# Patient Record
Sex: MALE | Race: WHITE | NOT HISPANIC OR LATINO | Employment: OTHER | URBAN - METROPOLITAN AREA
[De-identification: names, ages, dates, MRNs, and addresses within clinical notes are randomized per-mention and may not be internally consistent; named-entity substitution may affect disease eponyms.]

---

## 2017-01-20 ENCOUNTER — OFFICE VISIT (OUTPATIENT)
Dept: CARDIOLOGY | Facility: MEDICAL CENTER | Age: 62
End: 2017-01-20
Payer: COMMERCIAL

## 2017-01-20 VITALS
HEIGHT: 77 IN | OXYGEN SATURATION: 96 % | DIASTOLIC BLOOD PRESSURE: 78 MMHG | WEIGHT: 242 LBS | SYSTOLIC BLOOD PRESSURE: 116 MMHG | HEART RATE: 81 BPM | BODY MASS INDEX: 28.57 KG/M2

## 2017-01-20 DIAGNOSIS — I48.3 TYPICAL ATRIAL FLUTTER (HCC): ICD-10-CM

## 2017-01-20 LAB — EKG IMPRESSION: NORMAL

## 2017-01-20 PROCEDURE — 93000 ELECTROCARDIOGRAM COMPLETE: CPT | Performed by: INTERNAL MEDICINE

## 2017-01-20 PROCEDURE — 99214 OFFICE O/P EST MOD 30 MIN: CPT | Performed by: INTERNAL MEDICINE

## 2017-01-20 NOTE — MR AVS SNAPSHOT
"        Benjamin John   2017 8:20 AM   Office Visit   MRN: [id number]    Department:  [facility]   Dept Phone:  [phone]    Description:  Male : 1955   Provider:  Ray Woods M.D.           Reason for Visit     Follow-Up           Allergies as of 2017     Allergen Noted Reactions    Codeine 2016   Vomiting, Nausea      You were diagnosed with     Atypical atrial flutter (CMS-[facility])   [168252]         Vital Signs     Blood Pressure Pulse Height Weight Body Mass Index Oxygen Saturation    116/78 mmHg 81 1.956 m (6' 5.01\") 109.77 kg (242 lb) 28.69 kg/m2 96%    Smoking Status                   Former Smoker           Basic Information     Date Of Birth Sex Race Ethnicity Preferred Language    1955 Male White Non- English      Your appointments     2017  9:00 AM   New Patient with Abner Porras M.D.   [facility] (--)    [address] [id number] [phone]           Please bring enclosed paperwork completed along with your insurance card and photo ID.              Problem List              ICD-10-CM Priority Class Noted - Resolved    Atrial flutter with rapid ventricular response (CMS-[facility]) I48.92   2016 - Present    Atrial flutter (CMS-[facility]) I48.92   2016 - Present      Health Maintenance        Date Due Completion Dates    IMM DTaP/Tdap/Td Vaccine (1 - Tdap) 1973 ---    COLONOSCOPY 2004 ---    IMM ZOSTER VACCINE 2014 ---    IMM INFLUENZA (1) 2016 ---            Results       Current Immunizations     No immunizations on file.      Below and/or attached are the medications your provider expects you to take. Review all of your home medications and newly ordered medications with your provider and/or pharmacist. Follow medication instructions as directed by your provider and/or pharmacist. Please keep your medication list with you and share with your provider. Update the information " when medications are discontinued, doses are changed, or new medications (including over-the-counter products) are added; and carry medication information at all times in the event of emergency situations     Allergies:  CODEINE - Vomiting,Nausea               Medications  Valid as of: January 20, 2017 -  8:40 AM    Generic Name Brand Name Tablet Size Instructions for use    Apixaban (Tab) ELIQUIS 5mg Take 1 Tab by mouth 2 Times a Day.        Coenzyme Q10 (Cap) coenzyme Q-10 30 MG Take 60 mg by mouth every day.        Creatine Monohydrate   Take  by mouth.        Digoxin (Tab) LANOXIN 250 MCG Take 1 Tab by mouth every day.        Flecainide Acetate (Tab) TAMBOCOR 50 MG Take 1 Tab by mouth 2 times a day.        Garlic   Take 1 Cap by mouth every day.        Homeopathic Products   Take 1 Tab by mouth 2 times a day as needed (cold/ flu symptoms).        Magnesium   Take 1 Tab by mouth 2 Times a Day.        Multiple Vitamins-Minerals (Tab) CENTRUM SILVER ULTRA MENS  Take 1 tablet by mouth every day.        Omega-3 Fatty Acids   Take 1 Cap by mouth every day.        Probiotic Product (Cap) Probiotic  Take 2 Caps by mouth every day.        .                 Medicines prescribed today were sent to:     [facility][id number] - [place], [place] - [address]    [address] [address]    Phone: [phone] Fax: [phone]    Open 24 Hours?: No    [id number] - [place], [place] - [address]    [address] [address]    Phone: [phone] Fax: [phone]    Open 24 Hours?: No      Medication refill instructions:       If your prescription bottle indicates you have medication refills left, it is not necessary to call your provider’s office. Please contact your pharmacy and they will refill your medication.    If your prescription bottle indicates you do not have any refills left, you may request refills at any time through one of the following ways: The online [employer]  system (except Urgent Care), by calling your provider’s office, or by asking your pharmacy to contact your provider’s office with a refill request. Medication refills are processed only during regular business hours and may not be available until the next business day. Your provider may request additional information or to have a follow-up visit with you prior to refilling your medication.   *Please Note: Medication refills are assigned a new Rx number when refilled electronically. Your pharmacy may indicate that no refills were authorized even though a new prescription for the same medication is available at the pharmacy. Please request the medicine by name with the pharmacy before contacting your provider for a refill.        Your To Do List     Future Labs/Procedures Complete By Expires    [facility] [employer] PATCH MONITOR  As directed 1/20/2018         [employer] Access Code: [email or web address]  Expires: 1/31/2017  8:17 AM    [employer]  A secure, online tool to manage your health information     [employer]’s [employer]® is a secure, online tool that connects you to your personalized health information from the privacy of your home -- day or night - making it very easy for you to manage your healthcare. Once the activation process is completed, you can even access your medical information using the [employer] yamil, which is available for free in the Apple Yamil store or Google Play store.     [employer] provides the following levels of access (as shown below):   My Chart Features   [facility] Doctor [facility]  Specialists [facility]  Urgent  Care Non-[facility]  Primary Care  Doctor   Email your healthcare team securely and privately 24/7 X X X    Manage appointments: schedule your next appointment; view details of past/upcoming appointments X      Request prescription refills. X      View recent personal medical records, including lab and immunizations X X X X   View health record, including health history, allergies, medications X X X X   Read  reports about your outpatient visits, procedures, consult and ER notes X X X X   See your discharge summary, which is a recap of your hospital and/or ER visit that includes your diagnosis, lab results, and care plan. X X       How to register for [employer]:  1. Go to  [email or web address].  2. Click on the Sign Up Now box, which takes you to the New Member Sign Up page. You will need to provide the following information:  a. Enter your [employer] Access Code exactly as it appears at the top of this page. (You will not need to use this code after you’ve completed the sign-up process. If you do not sign up before the expiration date, you must request a new code.)   b. Enter your date of birth.   c. Enter your home email address.   d. Click Submit, and follow the next screen’s instructions.  3. Create a [employer]Eyes ID. This will be your [employer] login ID and cannot be changed, so think of one that is secure and easy to remember.  4. Create a [employer] password. You can change your password at any time.  5. Enter your Password Reset Question and Answer. This can be used at a later time if you forget your password.   6. Enter your e-mail address. This allows you to receive e-mail notifications when new information is available in [employer].  7. Click Sign Up. You can now view your health information.    For assistance activating your [employer] account, call [phone]

## 2017-01-20 NOTE — PROGRESS NOTES
"Subjective:   Benjamin John is a 61 y.o. male who presents today with previous flutter with intercurrent illness. Echo and TFT's nl. Feels well. Sleep study pending. The patients [id number] score is 0    Past Medical History   Diagnosis Date   • Sepsis (CMS-HCC) 2009     secondary to perf'd bowel after colonscopy      History reviewed. No pertinent past surgical history.  History reviewed. No pertinent family history.  History   Smoking status   • Former Smoker   Smokeless tobacco   • Never Used     Allergies   Allergen Reactions   • Codeine Vomiting and Nausea     Outpatient Encounter Prescriptions as of 1/20/2017   Medication Sig Dispense Refill   • coenzyme Q-10 30 MG capsule Take 60 mg by mouth every day.     • flecainide (TAMBOCOR) 50 MG tablet Take 1 Tab by mouth 2 times a day. 180 Tab 3   • digoxin (LANOXIN) 250 MCG Tab Take 1 Tab by mouth every day. 90 Tab 3   • CREATINE MONOHYDRATE PO Take  by mouth.     • apixaban (ELIQUIS) 5mg Tab Take 1 Tab by mouth 2 Times a Day. 60 Tab 11   • Multiple Vitamins-Minerals (CENTRUM SILVER ULTRA MENS) Tab Take 1 tablet by mouth every day.     • GARLIC PO Take 1 Cap by mouth every day.     • Omega-3 Fatty Acids (FISH OIL PO) Take 1 Cap by mouth every day.     • MAGNESIUM PO Take 1 Tab by mouth 2 Times a Day.     • Probiotic Cap Take 2 Caps by mouth every day.     • Homeopathic Products (ZICAM COLD REMEDY PO) Take 1 Tab by mouth 2 times a day as needed (cold/ flu symptoms).       No facility-administered encounter medications on file as of 1/20/2017.     ROS     Objective:   /78 mmHg  Pulse 81  Ht 1.956 m (6' 5.01\")  Wt 109.77 kg (242 lb)  BMI 28.69 kg/m2  SpO2 96%    Physical Exam   Constitutional: He is oriented to person, place, and time. He appears well-developed and well-nourished.   HENT:   Mouth/Throat: Oropharynx is clear and moist.   Eyes: Conjunctivae and EOM are normal.   Neck: No JVD present. No thyroid mass present.   Cardiovascular: Normal " rate, regular rhythm, S1 normal, S2 normal and normal pulses.  PMI is not displaced.  Exam reveals no gallop.    No murmur heard.  Pulses:       Carotid pulses are 2+ on the right side, and 2+ on the left side.       Radial pulses are 2+ on the right side, and 2+ on the left side.        Femoral pulses are 2+ on the right side, and 2+ on the left side.       Dorsalis pedis pulses are 2+ on the right side, and 2+ on the left side.   No peripheral edema.   Pulmonary/Chest: Effort normal and breath sounds normal.   Abdominal: Soft. Normal appearance. He exhibits no abdominal bruit. There is no hepatosplenomegaly. There is no tenderness.   Musculoskeletal: Normal range of motion. He exhibits no edema.        Thoracic back: He exhibits no tenderness and no spasm.   Neurological: He is alert and oriented to person, place, and time.   Skin: No rash noted. No cyanosis. Nails show no clubbing.   Psychiatric: He has a normal mood and affect.       Assessment:     1. Typical atrial flutter (CMS-HCC)         Medical Decision Making:  Today's Assessment / Status / Plan:   1. Atrial flutter DC NOAC. Taper Tambocor.  Check zio.  2. Sleep study pending.  3. F/U in thee months.

## 2017-01-20 NOTE — Clinical Note
"     [facility]   [address], [address]  [place] [address]  Phone: [phone]  Fax: [phone]              Benjamin John  1955    Encounter Date: 1/20/2017    Ray Woods M.D.          PROGRESS NOTE:  Subjective:   Benjamin John is a 61 y.o. male who presents today with previous flutter with intercurrent illness. Echo and TFT's nl. Feels well. Sleep study pending. The patients [id number] score is 0    Past Medical History   Diagnosis Date   • Sepsis (CMS-HCC) 2009     secondary to perf'd bowel after colonscopy      History reviewed. No pertinent past surgical history.  History reviewed. No pertinent family history.  History   Smoking status   • Former Smoker   Smokeless tobacco   • Never Used     Allergies   Allergen Reactions   • Codeine Vomiting and Nausea     Outpatient Encounter Prescriptions as of 1/20/2017   Medication Sig Dispense Refill   • coenzyme Q-10 30 MG capsule Take 60 mg by mouth every day.     • flecainide (TAMBOCOR) 50 MG tablet Take 1 Tab by mouth 2 times a day. 180 Tab 3   • digoxin (LANOXIN) 250 MCG Tab Take 1 Tab by mouth every day. 90 Tab 3   • CREATINE MONOHYDRATE PO Take  by mouth.     • apixaban (ELIQUIS) 5mg Tab Take 1 Tab by mouth 2 Times a Day. 60 Tab 11   • Multiple Vitamins-Minerals (CENTRUM SILVER ULTRA MENS) Tab Take 1 tablet by mouth every day.     • GARLIC PO Take 1 Cap by mouth every day.     • Omega-3 Fatty Acids (FISH OIL PO) Take 1 Cap by mouth every day.     • MAGNESIUM PO Take 1 Tab by mouth 2 Times a Day.     • Probiotic Cap Take 2 Caps by mouth every day.     • Homeopathic Products (ZICAM COLD REMEDY PO) Take 1 Tab by mouth 2 times a day as needed (cold/ flu symptoms).       No facility-administered encounter medications on file as of 1/20/2017.     ROS     Objective:   /78 mmHg  Pulse 81  Ht 1.956 m (6' 5.01\")  Wt 109.77 kg (242 lb)  BMI 28.69 kg/m2  SpO2 96%    Physical Exam   "   Constitutional: He is oriented to person, place, and time. He appears well-developed and well-nourished.   HENT:   Mouth/Throat: Oropharynx is clear and moist.   Eyes: Conjunctivae and EOM are normal.   Neck: No JVD present. No thyroid mass present.   Cardiovascular: Normal rate, regular rhythm, S1 normal, S2 normal and normal pulses.  PMI is not displaced.  Exam reveals no gallop.    No murmur heard.  Pulses:       Carotid pulses are 2+ on the right side, and 2+ on the left side.       Radial pulses are 2+ on the right side, and 2+ on the left side.        Femoral pulses are 2+ on the right side, and 2+ on the left side.       Dorsalis pedis pulses are 2+ on the right side, and 2+ on the left side.   No peripheral edema.   Pulmonary/Chest: Effort normal and breath sounds normal.   Abdominal: Soft. Normal appearance. He exhibits no abdominal bruit. There is no hepatosplenomegaly. There is no tenderness.   Musculoskeletal: Normal range of motion. He exhibits no edema.        Thoracic back: He exhibits no tenderness and no spasm.   Neurological: He is alert and oriented to person, place, and time.   Skin: No rash noted. No cyanosis. Nails show no clubbing.   Psychiatric: He has a normal mood and affect.       Assessment:     1. Typical atrial flutter (CMS-HCC)         Medical Decision Making:  Today's Assessment / Status / Plan:   1. Atrial flutter DC NOAC. Taper Tambocor.  Check zio.  2. Sleep study pending.  3. F/U in thee months.        Germán Arroyo D.O.  [address]  [address]  [place] [address]  VIA Facsimile: [phone]

## 2017-02-15 ENCOUNTER — TELEPHONE (OUTPATIENT)
Dept: CARDIOLOGY | Facility: MEDICAL CENTER | Age: 62
End: 2017-02-15

## 2017-02-15 ENCOUNTER — NON-PROVIDER VISIT (OUTPATIENT)
Dept: CARDIOLOGY | Facility: MEDICAL CENTER | Age: 62
End: 2017-02-15
Attending: INTERNAL MEDICINE
Payer: COMMERCIAL

## 2017-02-15 DIAGNOSIS — I47.19 PAT (PAROXYSMAL ATRIAL TACHYCARDIA): ICD-10-CM

## 2017-02-15 DIAGNOSIS — I48.0 PAF (PAROXYSMAL ATRIAL FIBRILLATION) (HCC): ICD-10-CM

## 2017-02-16 ENCOUNTER — HOME STUDY (OUTPATIENT)
Dept: SLEEP MEDICINE | Facility: MEDICAL CENTER | Age: 62
End: 2017-02-16
Attending: INTERNAL MEDICINE
Payer: COMMERCIAL

## 2017-02-16 ENCOUNTER — SLEEP CENTER VISIT (OUTPATIENT)
Dept: SLEEP MEDICINE | Facility: MEDICAL CENTER | Age: 62
End: 2017-02-16
Payer: COMMERCIAL

## 2017-02-16 VITALS
SYSTOLIC BLOOD PRESSURE: 104 MMHG | HEART RATE: 83 BPM | HEIGHT: 77 IN | DIASTOLIC BLOOD PRESSURE: 68 MMHG | WEIGHT: 242 LBS | OXYGEN SATURATION: 96 % | BODY MASS INDEX: 28.57 KG/M2 | RESPIRATION RATE: 16 BRPM

## 2017-02-16 DIAGNOSIS — I48.0 PAROXYSMAL ATRIAL FIBRILLATION (HCC): ICD-10-CM

## 2017-02-16 PROCEDURE — 99203 OFFICE O/P NEW LOW 30 MIN: CPT | Performed by: INTERNAL MEDICINE

## 2017-02-16 PROCEDURE — 94762 N-INVAS EAR/PLS OXIMTRY CONT: CPT | Performed by: INTERNAL MEDICINE

## 2017-02-16 RX ORDER — ASPIRIN 81 MG/1
81 TABLET ORAL DAILY
COMMUNITY

## 2017-02-16 RX ORDER — UBIDECARENONE 30 MG
200 CAPSULE ORAL
COMMUNITY

## 2017-02-16 NOTE — PROGRESS NOTES
Benjamin John is a 61 y.o. male here for atrial fibrillation.  Patient was referred by Dr. Germán Arroyo.    History of Present Illness:    The patient is a 61-year-old with a recent history of atrial fibrillation. He says that he developed atrial fibrillation after or during a upper respiratory infection. He has no prior cardiac history. He has no history of lung disease. He is referred for possible sleep apnea. The patient has no symptoms of sleep apnea. He denies any daytime sleepiness and he does not snore. His wife has obstructive sleep apnea and she is on a CPAP machine. She specifically has observed him sleep and she has not seen him do any apnea and he doesn't snore. She has not witnessed that he gasps in his sleep. The patient denies any nocturnal gasping or choking or shortness of breath. He goes to bed between 10 and 11:00 at night and will follow sleep within 30 minutes. He wakes up at 6:30 in morning feeling rested. During the daytime he denies any sleepiness and is able to do all of his usual daily activities without difficulty and without excessive fatigue or tiredness. He denies a nocturnal dry throat he denies nocturnal sweats and denies morning headaches. He usually has to get up once during the night to use the bathroom. He denies nocturnal seizures and does not sleepwalk or physically active his dreams. He denies any restless legs or nocturnal leg kicking or jerking. He is a very remote smoker. He smoked one half pack a day for just about 2-3 years when he was in the . He denies any history of lung disease such as COPD or asthma. He denies any dyspnea on exertion or shortness of breath. Besides the atrial fibrillation the patient has had no other cardiac history such as congestive heart failure or coronary disease. The patient is now off of all medications for his atrial fibrillation and is now on a two-week monitor. He has remained in sinus rhythm.    Constitutional:  Negative  for fever, chills, sweats, and fatigue.  Eyes:  Negative for eye pain and visual changes.  HENT:  Negative for tinnitus and hoarse voice.  Cardiovascular:  Negative for chest pain, leg swelling, syncope and orthopnea.  Respiratory:  See HPI for pertinent negatives  Sleep:  Negative for somnolence, loud snoring, sleep disturbance due to breathing, insomnia.  Gastrointestinal:  Negative for dysphagia, nausea and abdominal pain.  Heme/lymph:  Denies easy bruising, blood clots.  Musculoskeletal:  Negative for arthralgias, sore muscles and back pain.  Skin:  Negative for rash and color change.  Neurological:  Negative for headaches, lightheadedness and weakness.  Psychiatric:  Denies depression.    Current Outpatient Prescriptions   Medication Sig Dispense Refill   • MELATONIN PO Take  by mouth.     • coenzyme Q-10 100 MG Cap capsule Take 200 mg by mouth every day.     • IRON PO Take  by mouth.     • aspirin (ASPIRIN ADULT LOW DOSE) 81 MG EC tablet Take  by mouth.     • CREATINE MONOHYDRATE PO Take  by mouth.     • Multiple Vitamins-Minerals (CENTRUM SILVER ULTRA MENS) Tab Take 1 tablet by mouth every day.     • GARLIC PO Take 1 Cap by mouth every day.     • Omega-3 Fatty Acids (FISH OIL PO) Take 1 Cap by mouth every day.     • MAGNESIUM PO Take 1 Tab by mouth 2 Times a Day.     • Probiotic Cap Take 2 Caps by mouth every day.     • coenzyme Q-10 30 MG capsule Take 60 mg by mouth every day.     • flecainide (TAMBOCOR) 50 MG tablet Take 1 Tab by mouth 2 times a day. 180 Tab 3   • digoxin (LANOXIN) 250 MCG Tab Take 1 Tab by mouth every day. 90 Tab 3   • apixaban (ELIQUIS) 5mg Tab Take 1 Tab by mouth 2 Times a Day. 60 Tab 11   • Homeopathic Products (ZICAM COLD REMEDY PO) Take 1 Tab by mouth 2 times a day as needed (cold/ flu symptoms).       No current facility-administered medications for this visit.       Social History   Substance Use Topics   • Smoking status: Former Smoker     Quit date: 01/01/1977   • Smokeless  "tobacco: Never Used   • Alcohol Use: No       Past Medical History   Diagnosis Date   • Sepsis (CMS-[facility]) 2009     secondary to perf'd bowel after colonscopy    • Nasal drainage    • Atrial fibrillation (CMS-HCC)    • Back pain    • Tonsillitis        Past Surgical History   Procedure Laterality Date   • Tonsillectomy     • Cholecystectomy         Allergies:  Codeine    Family History   Problem Relation Age of Onset   • Heart Attack Father    • Sleep Apnea Neg Hx        Physical Examination    Filed Vitals:    02/16/17 0858   Height: 1.956 m (6' 5\")   Weight: 109.77 kg (242 lb)   Weight % change since last entry.: 0 %   BP: 104/68   Pulse: 83   BMI (Calculated): 28.7   Resp: 16   Neck circumference: 16       Physical Exam:  Constitutional:  Well developed and well nourished.  Head:  Normocephalic and atraumatic.  Nose:  Nose normal.  Mouth/Throat:  Oropharynx is clear and moist, no lesions. Mallampati class I   Eyes:  Conjunctivae and EOM are normal.  Pupils are equal, round, and reactive to light.  Neck:  Normal range of motion.  Supple.  No JVD. No tracheal deviation.  No thyromegally  Cardiovascular:  Normal rate, regular rhythm, normal heart sounds and intact distal pulses.  Pulmonary/Chest:  No accessory muscle use.  No wheezing, rales or rhonchi.  No dullness to percussion, tenderness or deformity.  Abdominal:  Soft.  No ascites.  No Hepatosplenomegally.  Non tender.  Musculoskeletal.  Normal range of motion.  No muscular atrophy.  Lymphadenopathy:  No cervical or supraclavicular adenopathy  Neurological:  Alert and oriented.  Cranial nerves intact.  No focal deficits  Skin:  No rashes or ulcers.  Psyciatric:  Normal mood and affect.    Assessment and Plan:  1. Paroxysmal atrial fibrillation (CMS-HCC)  The patient has no clinical signs or symptoms of obstructive sleep apnea. It is possible that he may have sleep apnea and I have offered him an overnight sleep study but he is refusing at this time. He is willing " to do an overnight oximetry at home. If there is evidence of oxygen desaturations during his sleep then he will be willing to come in for sleep study at that time. Also if he develops recurring A. fib then will be willing to come in for sleep study but at this time he is convinced that the A. fib was just a unusual occurrence.  - OVERNIGHT OXIMETRY; Future          Followup Return for sooner if needed.

## 2017-02-16 NOTE — MR AVS SNAPSHOT
Benjamin John   2017 2:20 PM   Appointment   MRN: [id number]    Department:  [facility]   Dept Phone:  [phone]    Description:  Male : 1955   Provider:  SLEEP CLINIC           Allergies as of 2017     Allergen Noted Reactions    Codeine 2016   Vomiting, Nausea      You were diagnosed with     Paroxysmal atrial fibrillation (CMS-HCC)   [713176]         Vital Signs     Smoking Status                   Former Smoker           Basic Information     Date Of Birth Sex Race Ethnicity Preferred Language    1955 Male White Non- English      Your appointments     2017  2:20 PM   Overnight Oximetry with SLEEP CLINIC   [facility] (--)    [address]  [address]  [place] [id number] [phone]            2017  2:00 PM   FOLLOW UP with Ray Woods M.D.   [facility] (--)    [address], [address]  [place] [id number] [phone]              Problem List              ICD-10-CM Priority Class Noted - Resolved    Atrial flutter with rapid ventricular response (CMS-HCC) I48.92   2016 - Present    Atrial flutter (CMS-HCC) I48.92   2016 - Present      Health Maintenance        Date Due Completion Dates    IMM DTaP/Tdap/Td Vaccine (1 - Tdap) 1973 ---    COLONOSCOPY 2004 ---    IMM ZOSTER VACCINE 2014 ---    IMM INFLUENZA (1) 2016 ---            Current Immunizations     No immunizations on file.      Below and/or attached are the medications your provider expects you to take. Review all of your home medications and newly ordered medications with your provider and/or pharmacist. Follow medication instructions as directed by your provider and/or pharmacist. Please keep your medication list with you and share with your provider. Update the information when medications are discontinued, doses are changed, or new medications (including over-the-counter  products) are added; and carry medication information at all times in the event of emergency situations     Allergies:  CODEINE - Vomiting,Nausea               Medications  Valid as of: February 16, 2017 -  9:34 AM    Generic Name Brand Name Tablet Size Instructions for use    Apixaban (Tab) ELIQUIS 5mg Take 1 Tab by mouth 2 Times a Day.        Aspirin (Tablet Delayed Response) aspirin 81 MG Take  by mouth.        Coenzyme Q10 (Cap) coenzyme Q-10 30 MG Take 60 mg by mouth every day.        Coenzyme Q10 (Cap) coenzyme Q-10 100 MG Take 200 mg by mouth every day.        Creatine Monohydrate   Take  by mouth.        Digoxin (Tab) LANOXIN 250 MCG Take 1 Tab by mouth every day.        Flecainide Acetate (Tab) TAMBOCOR 50 MG Take 1 Tab by mouth 2 times a day.        Garlic   Take 1 Cap by mouth every day.        Homeopathic Products   Take 1 Tab by mouth 2 times a day as needed (cold/ flu symptoms).        Iron   Take  by mouth.        Magnesium   Take 1 Tab by mouth 2 Times a Day.        Melatonin   Take  by mouth.        Multiple Vitamins-Minerals (Tab) CENTRUM SILVER ULTRA MENS  Take 1 tablet by mouth every day.        Omega-3 Fatty Acids   Take 1 Cap by mouth every day.        Probiotic Product (Cap) Probiotic  Take 2 Caps by mouth every day.        .                 Medicines prescribed today were sent to:     [facility]Y #[id number] - [place], [place] - [address]    [address] [address]    Phone: [phone] Fax: [phone]    Open 24 Hours?: No    [id number] - [place] - [address] [address]    Phone: [phone] Fax: [phone]    Open 24 Hours?: No      Medication refill instructions:       If your prescription bottle indicates you have medication refills left, it is not necessary to call your provider’s office. Please contact your pharmacy and they will refill your medication.    If your prescription bottle indicates you do not  have any refills left, you may request refills at any time through one of the following ways: The online [employer] system (except Urgent Care), by calling your provider’s office, or by asking your pharmacy to contact your provider’s office with a refill request. Medication refills are processed only during regular business hours and may not be available until the next business day. Your provider may request additional information or to have a follow-up visit with you prior to refilling your medication.   *Please Note: Medication refills are assigned a new Rx number when refilled electronically. Your pharmacy may indicate that no refills were authorized even though a new prescription for the same medication is available at the pharmacy. Please request the medicine by name with the pharmacy before contacting your provider for a refill.           [employer] Access Code: Activation code not generated  Current [employer] Status: Active

## 2017-02-16 NOTE — MR AVS SNAPSHOT
"        Benjamin John   2017 9:00 AM   Sleep Center Visit   MRN: [id number]    Department:  [facility]   Dept Phone:  [phone]    Description:  Male : 1955   Provider:  Abner Porras M.D.           Reason for Visit     New Patient TRESA evaluation      Allergies as of 2017     Allergen Noted Reactions    Codeine 2016   Vomiting, Nausea      You were diagnosed with     Paroxysmal atrial fibrillation (CMS-[facility])   [567415]         Vital Signs     Blood Pressure Pulse Respirations Height Weight Body Mass Index    104/68 mmHg 83 16 1.956 m (6' 5\") 109.77 kg (242 lb) 28.69 kg/m2    Oxygen Saturation Smoking Status                96% Former Smoker          Basic Information     Date Of Birth Sex Race Ethnicity Preferred Language    1955 Male White Non- English      Your appointments     2017  2:20 PM   Overnight Oximetry with SLEEP CLINIC   [facility] (--)    [address]  [address]  [place] [id number] [phone]            2017  2:00 PM   FOLLOW UP with Ray Woods M.D.   [facility] (--)    [address]  [place] [id number] [phone]              Problem List              ICD-10-CM Priority Class Noted - Resolved    Atrial flutter with rapid ventricular response (CMS-[facility]) I48.92   2016 - Present    Atrial flutter (CMS-[facility]) I48.92   2016 - Present      Health Maintenance        Date Due Completion Dates    IMM DTaP/Tdap/Td Vaccine (1 - Tdap) 1973 ---    COLONOSCOPY 2004 ---    IMM ZOSTER VACCINE 2014 ---    IMM INFLUENZA (1) 2016 ---            Current Immunizations     No immunizations on file.      Below and/or attached are the medications your provider expects you to take. Review all of your home medications and newly ordered medications with your provider and/or pharmacist. Follow medication instructions as directed by your " provider and/or pharmacist. Please keep your medication list with you and share with your provider. Update the information when medications are discontinued, doses are changed, or new medications (including over-the-counter products) are added; and carry medication information at all times in the event of emergency situations     Allergies:  CODEINE - Vomiting,Nausea               Medications  Valid as of: February 16, 2017 -  9:31 AM    Generic Name Brand Name Tablet Size Instructions for use    Apixaban (Tab) ELIQUIS 5mg Take 1 Tab by mouth 2 Times a Day.        Aspirin (Tablet Delayed Response) aspirin 81 MG Take  by mouth.        Coenzyme Q10 (Cap) coenzyme Q-10 30 MG Take 60 mg by mouth every day.        Coenzyme Q10 (Cap) coenzyme Q-10 100 MG Take 200 mg by mouth every day.        Creatine Monohydrate   Take  by mouth.        Digoxin (Tab) LANOXIN 250 MCG Take 1 Tab by mouth every day.        Flecainide Acetate (Tab) TAMBOCOR 50 MG Take 1 Tab by mouth 2 times a day.        Garlic   Take 1 Cap by mouth every day.        Homeopathic Products   Take 1 Tab by mouth 2 times a day as needed (cold/ flu symptoms).        Iron   Take  by mouth.        Magnesium   Take 1 Tab by mouth 2 Times a Day.        Melatonin   Take  by mouth.        Multiple Vitamins-Minerals (Tab) CENTRUM SILVER ULTRA MENS  Take 1 tablet by mouth every day.        Omega-3 Fatty Acids   Take 1 Cap by mouth every day.        Probiotic Product (Cap) Probiotic  Take 2 Caps by mouth every day.        .                 Medicines prescribed today were sent to:     [facility]ARMACY #[id number] - [place], [place] - [address]    [address] [address]    Phone: [phone] Fax: [phone]    Open 24 Hours?: No    [id number] - [place], [place] - [address]    [address] [facility] [place] [address]    Phone: [phone] Fax: [phone]    Open 24 Hours?: No      Medication refill instructions:       If your  prescription bottle indicates you have medication refills left, it is not necessary to call your provider’s office. Please contact your pharmacy and they will refill your medication.    If your prescription bottle indicates you do not have any refills left, you may request refills at any time through one of the following ways: The online [employer] system (except Urgent Care), by calling your provider’s office, or by asking your pharmacy to contact your provider’s office with a refill request. Medication refills are processed only during regular business hours and may not be available until the next business day. Your provider may request additional information or to have a follow-up visit with you prior to refilling your medication.   *Please Note: Medication refills are assigned a new Rx number when refilled electronically. Your pharmacy may indicate that no refills were authorized even though a new prescription for the same medication is available at the pharmacy. Please request the medicine by name with the pharmacy before contacting your provider for a refill.        Your To Do List     Future Labs/Procedures Complete By Expires    OVERNIGHT OXIMETRY  As directed 2/16/2018      Instructions    1.  We will perform overnight oximetry. If this is consistent with sleep apnea and then will recommend a sleep study  2. Recommend follow-up as needed          [employer] Access Code: Activation code not generated  Current [employer] Status: Active

## 2017-02-16 NOTE — PATIENT INSTRUCTIONS
1.  We will perform overnight oximetry. If this is consistent with sleep apnea and then will recommend a sleep study  2. Recommend follow-up as needed

## 2017-02-21 NOTE — PROCEDURES
Overnight oximetry accomplished on February 16, 2017 demonstrated significant oxygen desaturation below 90% entire time monitored. This was accomplished on room air, the cluster pattern suggestive could be sleep-disordered breathing but potentially underlying pulmonary disorder could also contribute, clinical correlation required. The average low saturation was 85% and the patient will lose below 90% saturation 92% of the time monitored

## 2017-03-08 ENCOUNTER — TELEPHONE (OUTPATIENT)
Dept: CARDIOLOGY | Facility: MEDICAL CENTER | Age: 62
End: 2017-03-08

## 2017-03-08 PROCEDURE — 0296T PR EXT ECG > 48HR TO 21 DAY RCRD W/CONECT INTL RCRD: CPT | Performed by: INTERNAL MEDICINE

## 2017-03-08 PROCEDURE — 0298T PR EXT ECG > 48HR TO 21 DAY REVIEW AND INTERPRETATN: CPT | Performed by: INTERNAL MEDICINE

## 2017-04-14 ENCOUNTER — TELEPHONE (OUTPATIENT)
Dept: CARDIOLOGY | Facility: MEDICAL CENTER | Age: 62
End: 2017-04-14

## 2017-04-14 NOTE — TELEPHONE ENCOUNTER
----- Message from Ruby Allen sent at 4/14/2017  8:29 AM PDT -----  Regarding: patient has question about blood pressure  REECE/Anna Marie      Patient has a question about blood pressure. He can be reached at [phone]

## 2017-04-14 NOTE — TELEPHONE ENCOUNTER
Pt reports he has been observing his BP and it has been unusually high.  Reports 170/100-103.  Saw his PCP and it was suggested that the testosterone medication could be causing BP to elevate and pt was advised to stop taking it for 10 days.    He stopped as of 4/13 and BP is still high. Having trouble sleeping.  Has headache.    Recommended ER if BP elevates to about 200/120.  Otherwise continue with plan, use tylenol for HA, continue following low sodium diet and relax if possible.  Discuss with PCP regarding sleeplessness.  Call next week if BP continues to be elevated off testosterone.

## 2017-04-26 ENCOUNTER — OFFICE VISIT (OUTPATIENT)
Dept: CARDIOLOGY | Facility: MEDICAL CENTER | Age: 62
End: 2017-04-26
Payer: COMMERCIAL

## 2017-04-26 VITALS
SYSTOLIC BLOOD PRESSURE: 136 MMHG | WEIGHT: 254 LBS | DIASTOLIC BLOOD PRESSURE: 86 MMHG | BODY MASS INDEX: 29.99 KG/M2 | HEART RATE: 92 BPM | HEIGHT: 77 IN | OXYGEN SATURATION: 95 %

## 2017-04-26 DIAGNOSIS — G47.30 SLEEP APNEA, UNSPECIFIED TYPE: ICD-10-CM

## 2017-04-26 DIAGNOSIS — I48.92 ATRIAL FLUTTER, UNSPECIFIED TYPE (HCC): ICD-10-CM

## 2017-04-26 DIAGNOSIS — I48.92 ATRIAL FLUTTER WITH RAPID VENTRICULAR RESPONSE (HCC): ICD-10-CM

## 2017-04-26 LAB — EKG IMPRESSION: NORMAL

## 2017-04-26 PROCEDURE — 99214 OFFICE O/P EST MOD 30 MIN: CPT | Performed by: INTERNAL MEDICINE

## 2017-04-26 PROCEDURE — 93000 ELECTROCARDIOGRAM COMPLETE: CPT | Performed by: INTERNAL MEDICINE

## 2017-04-26 NOTE — Clinical Note
[facility]   [address]  [place] [address]  Phone: [phone]  Fax: [phone]              Benjamin John  1955    Encounter Date: 4/26/2017    Ray Woods M.D.          PROGRESS NOTE:  Subjective:   Benjamin John is a 61 y.o. male who presents today with h/o atrial arrhythmias. Did not feel well on Tambocor. Zio with minimal episodes. Feels ok. Occasional palps.  The patients [id number] score is 0.  Does not want a sleep study despite nighttime oximetry being abnormal.    Past Medical History   Diagnosis Date   • Sepsis (CMS-[facility]) 2009     secondary to perf'd bowel after colonscopy    • Nasal drainage    • Atrial fibrillation (CMS-[facility])    • Back pain    • Tonsillitis      Past Surgical History   Procedure Laterality Date   • Tonsillectomy     • Cholecystectomy       Family History   Problem Relation Age of Onset   • Heart Attack Father    • Sleep Apnea Neg Hx      History   Smoking status   • Former Smoker   • Quit date: 01/01/1977   Smokeless tobacco   • Never Used     Allergies   Allergen Reactions   • Codeine Vomiting and Nausea     Outpatient Encounter Prescriptions as of 4/26/2017   Medication Sig Dispense Refill   • RA KRILL OIL PO Take  by mouth.     • Cholecalciferol (VITAMIN D3 PO) Take  by mouth.     • MELATONIN PO Take  by mouth.     • coenzyme Q-10 100 MG Cap capsule Take 200 mg by mouth every day.     • IRON PO Take  by mouth.     • aspirin (ASPIRIN ADULT LOW DOSE) 81 MG EC tablet Take  by mouth.     • CREATINE MONOHYDRATE PO Take  by mouth.     • Multiple Vitamins-Minerals (CENTRUM SILVER ULTRA MENS) Tab Take 1 tablet by mouth every day.     • GARLIC PO Take 1 Cap by mouth every day.     • Omega-3 Fatty Acids (FISH OIL PO) Take 1 Cap by mouth every day.     • MAGNESIUM PO Take 1 Tab by mouth 2 Times a Day.     • Probiotic Cap Take 2 Caps by mouth every day.     • coenzyme Q-10 30 MG capsule Take 60 mg by mouth  "every day.     • flecainide (TAMBOCOR) 50 MG tablet Take 1 Tab by mouth 2 times a day. 180 Tab 3   • digoxin (LANOXIN) 250 MCG Tab Take 1 Tab by mouth every day. 90 Tab 3   • apixaban (ELIQUIS) 5mg Tab Take 1 Tab by mouth 2 Times a Day. 60 Tab 11   • Homeopathic Products (ZICAM COLD REMEDY PO) Take 1 Tab by mouth 2 times a day as needed (cold/ flu symptoms).       No facility-administered encounter medications on file as of 4/26/2017.     ROS     Objective:   /86 mmHg  Pulse 92  Ht 1.956 m (6' 5.01\")  Wt 115.214 kg (254 lb)  BMI 30.11 kg/m2  SpO2 95%    Physical Exam   Constitutional: He is oriented to person, place, and time. He appears well-developed and well-nourished.   HENT:   Mouth/Throat: Oropharynx is clear and moist.   Eyes: Conjunctivae and EOM are normal.   Neck: No JVD present. No thyroid mass present.   Cardiovascular: Normal rate, regular rhythm, S1 normal, S2 normal and normal pulses.  PMI is not displaced.  Exam reveals no gallop.    No murmur heard.  Pulses:       Carotid pulses are 2+ on the right side, and 2+ on the left side.       Radial pulses are 2+ on the right side, and 2+ on the left side.        Femoral pulses are 2+ on the right side, and 2+ on the left side.       Dorsalis pedis pulses are 2+ on the right side, and 2+ on the left side.   No peripheral edema.   Pulmonary/Chest: Effort normal and breath sounds normal.   Abdominal: Soft. Normal appearance. He exhibits no abdominal bruit. There is no hepatosplenomegaly. There is no tenderness.   Musculoskeletal: Normal range of motion. He exhibits no edema.        Thoracic back: He exhibits no tenderness and no spasm.   Neurological: He is alert and oriented to person, place, and time.   Skin: No rash noted. No cyanosis. Nails show no clubbing.   Psychiatric: He has a normal mood and affect.       Assessment:     1. Atrial flutter, unspecified type (CMS-HCC)  EKG   2. Atrial flutter with rapid ventricular response (CMS-HCC)     3. " Sleep apnea, unspecified type         Medical Decision Making:  Today's Assessment / Status / Plan:     1. A fib flutter not too troubling.  2. Continue ASA.  3. Does not want a sleep study.  4. F/U in 6 months.      Germán Arroyo D.O.  [address]  [address]  [place] [address]  VIA Facsimile: [phone]

## 2017-04-26 NOTE — PROGRESS NOTES
Subjective:   Benjamin John is a 61 y.o. male who presents today with h/o atrial arrhythmias. Did not feel well on Tambocor. Zio with minimal episodes. Feels ok. Occasional palps.  The patients [id number] score is 0.  Does not want a sleep study despite nighttime oximetry being abnormal.    Past Medical History   Diagnosis Date   • Sepsis (CMS-[facility]) 2009     secondary to perf'd bowel after colonscopy    • Nasal drainage    • Atrial fibrillation (CMS-[facility])    • Back pain    • Tonsillitis      Past Surgical History   Procedure Laterality Date   • Tonsillectomy     • Cholecystectomy       Family History   Problem Relation Age of Onset   • Heart Attack Father    • Sleep Apnea Neg Hx      History   Smoking status   • Former Smoker   • Quit date: 01/01/1977   Smokeless tobacco   • Never Used     Allergies   Allergen Reactions   • Codeine Vomiting and Nausea     Outpatient Encounter Prescriptions as of 4/26/2017   Medication Sig Dispense Refill   • RA KRILL OIL PO Take  by mouth.     • Cholecalciferol (VITAMIN D3 PO) Take  by mouth.     • MELATONIN PO Take  by mouth.     • coenzyme Q-10 100 MG Cap capsule Take 200 mg by mouth every day.     • IRON PO Take  by mouth.     • aspirin (ASPIRIN ADULT LOW DOSE) 81 MG EC tablet Take  by mouth.     • CREATINE MONOHYDRATE PO Take  by mouth.     • Multiple Vitamins-Minerals (CENTRUM SILVER ULTRA MENS) Tab Take 1 tablet by mouth every day.     • GARLIC PO Take 1 Cap by mouth every day.     • Omega-3 Fatty Acids (FISH OIL PO) Take 1 Cap by mouth every day.     • MAGNESIUM PO Take 1 Tab by mouth 2 Times a Day.     • Probiotic Cap Take 2 Caps by mouth every day.     • coenzyme Q-10 30 MG capsule Take 60 mg by mouth every day.     • flecainide (TAMBOCOR) 50 MG tablet Take 1 Tab by mouth 2 times a day. 180 Tab 3   • digoxin (LANOXIN) 250 MCG Tab Take 1 Tab by mouth every day. 90 Tab 3   • apixaban (ELIQUIS) 5mg Tab Take 1 Tab by mouth 2 Times a Day. 60 Tab 11   • Homeopathic  "Products (ZICAM COLD REMEDY PO) Take 1 Tab by mouth 2 times a day as needed (cold/ flu symptoms).       No facility-administered encounter medications on file as of 4/26/2017.     ROS     Objective:   /86 mmHg  Pulse 92  Ht 1.956 m (6' 5.01\")  Wt 115.214 kg (254 lb)  BMI 30.11 kg/m2  SpO2 95%    Physical Exam   Constitutional: He is oriented to person, place, and time. He appears well-developed and well-nourished.   HENT:   Mouth/Throat: Oropharynx is clear and moist.   Eyes: Conjunctivae and EOM are normal.   Neck: No JVD present. No thyroid mass present.   Cardiovascular: Normal rate, regular rhythm, S1 normal, S2 normal and normal pulses.  PMI is not displaced.  Exam reveals no gallop.    No murmur heard.  Pulses:       Carotid pulses are 2+ on the right side, and 2+ on the left side.       Radial pulses are 2+ on the right side, and 2+ on the left side.        Femoral pulses are 2+ on the right side, and 2+ on the left side.       Dorsalis pedis pulses are 2+ on the right side, and 2+ on the left side.   No peripheral edema.   Pulmonary/Chest: Effort normal and breath sounds normal.   Abdominal: Soft. Normal appearance. He exhibits no abdominal bruit. There is no hepatosplenomegaly. There is no tenderness.   Musculoskeletal: Normal range of motion. He exhibits no edema.        Thoracic back: He exhibits no tenderness and no spasm.   Neurological: He is alert and oriented to person, place, and time.   Skin: No rash noted. No cyanosis. Nails show no clubbing.   Psychiatric: He has a normal mood and affect.       Assessment:     1. Atrial flutter, unspecified type (CMS-HCC)  EKG   2. Atrial flutter with rapid ventricular response (CMS-HCC)     3. Sleep apnea, unspecified type         Medical Decision Making:  Today's Assessment / Status / Plan:     1. A fib flutter not too troubling.  2. Continue ASA.  3. Does not want a sleep study.  4. F/U in 6 months.  "

## 2017-04-26 NOTE — MR AVS SNAPSHOT
"        Benjamin Mendes   2017 2:00 PM   Office Visit   MRN: [id number]    Department:  [facility]   Dept Phone:  [phone]    Description:  Male : 1955   Provider:  Alisha Woods M.D.           Reason for Visit     Follow-Up           Allergies as of 2017     Allergen Noted Reactions    Codeine 2016   Vomiting, Nausea      You were diagnosed with     Atrial flutter, unspecified type (CMS-HCC)   [6679652]       Atrial flutter with rapid ventricular response (CMS-HCC)   [933775]       Sleep apnea, unspecified type   [3591174]         Vital Signs     Blood Pressure Pulse Height Weight Body Mass Index Oxygen Saturation    136/86 mmHg 92 1.956 m (6' 5.01\") 115.214 kg (254 lb) 30.11 kg/m2 95%    Smoking Status                   Former Smoker           Basic Information     Date Of Birth Sex Race Ethnicity Preferred Language    1955 Male White Non- English      Your appointments     Oct 30, 2017 10:00 AM   FOLLOW UP with Alisha Woods M.D.   [facility] (--)    [address], [address]  [place] [address]   [phone]              Problem List              ICD-10-CM Priority Class Noted - Resolved    Atrial flutter with rapid ventricular response (CMS-HCC) I48.92   2016 - Present    Atrial flutter (CMS-HCC) I48.92   2016 - Present    Sleep apnea G47.30   2017 - Present      Health Maintenance        Date Due Completion Dates    IMM DTaP/Tdap/Td Vaccine (1 - Tdap) 1973 ---    COLONOSCOPY 2004 ---    IMM ZOSTER VACCINE 2014 ---            Results     EKG      Component    Report    [facility]    Test Date:  2017  Pt Name:    BENJAMIN MENDES               Department: [facility]  MRN:        [id number]                      Room:  Gender:     M                            Technician: KAYLA  :        1955                   Requested By:ALISHA WOODS  Order #:    [id number]                  "   Reading MD: Ray Woods MD    Measurements  Intervals                                Axis  Rate:       78                           P:          53  WI:         152                          QRS:        44  QRSD:       78                           T:          53  QT:         368  QTc:        420    Interpretive Statements  SINUS RHYTHM  EARLY PRECORDIAL R/S TRANSITION  Compared to ECG 01/20/2017 08:19:18  No significant changes    Electronically Signed On 4- 14:10:11 PDT by Ray Woods MD                          Current Immunizations     No immunizations on file.      Below and/or attached are the medications your provider expects you to take. Review all of your home medications and newly ordered medications with your provider and/or pharmacist. Follow medication instructions as directed by your provider and/or pharmacist. Please keep your medication list with you and share with your provider. Update the information when medications are discontinued, doses are changed, or new medications (including over-the-counter products) are added; and carry medication information at all times in the event of emergency situations     Allergies:  CODEINE - Vomiting,Nausea               Medications  Valid as of: April 26, 2017 -  2:30 PM    Generic Name Brand Name Tablet Size Instructions for use    Apixaban (Tab) ELIQUIS 5mg Take 1 Tab by mouth 2 Times a Day.        Aspirin (Tablet Delayed Response) aspirin 81 MG Take  by mouth.        Cholecalciferol   Take  by mouth.        Coenzyme Q10 (Cap) coenzyme Q-10 30 MG Take 60 mg by mouth every day.        Coenzyme Q10 (Cap) coenzyme Q-10 100 MG Take 200 mg by mouth every day.        Creatine Monohydrate   Take  by mouth.        Digoxin (Tab) LANOXIN 250 MCG Take 1 Tab by mouth every day.        Flecainide Acetate (Tab) TAMBOCOR 50 MG Take 1 Tab by mouth 2 times a day.        Garlic   Take 1 Cap by mouth every day.        Homeopathic Products   Take 1 Tab by mouth 2 times a  day as needed (cold/ flu symptoms).        Iron   Take  by mouth.        Krill Oil   Take  by mouth.        Magnesium   Take 1 Tab by mouth 2 Times a Day.        Melatonin   Take  by mouth.        Multiple Vitamins-Minerals (Tab) CENTRUM SILVER ULTRA MENS  Take 1 tablet by mouth every day.        Omega-3 Fatty Acids   Take 1 Cap by mouth every day.        Probiotic Product (Cap) Probiotic  Take 2 Caps by mouth every day.        .                 Medicines prescribed today were sent to:     [facility]ARMACY #[id number] - [place], [place] - [address]    [address] [place] [address]    Phone: [phone] Fax: [phone]    Open 24 Hours?: No    [id number] - [place], [place] - [address]    [address] [place] [address]    Phone: [phone] Fax: [phone]    Open 24 Hours?: No      Medication refill instructions:       If your prescription bottle indicates you have medication refills left, it is not necessary to call your provider’s office. Please contact your pharmacy and they will refill your medication.    If your prescription bottle indicates you do not have any refills left, you may request refills at any time through one of the following ways: The online [facility] system (except Urgent Care), by calling your provider’s office, or by asking your pharmacy to contact your provider’s office with a refill request. Medication refills are processed only during regular business hours and may not be available until the next business day. Your provider may request additional information or to have a follow-up visit with you prior to refilling your medication.   *Please Note: Medication refills are assigned a new Rx number when refilled electronically. Your pharmacy may indicate that no refills were authorized even though a new prescription for the same medication is available at the pharmacy. Please request the medicine by name with the pharmacy before contacting your provider for  a refill.           [employer] Access Code: Activation code not generated  Current [employer] Status: Active

## 2017-05-30 NOTE — TELEPHONE ENCOUNTER
Patient has received billing from [employer] for full price of monitor. I have spoken with our Auth Dept and Mae's. This was an out of network service. And as that no auth was necessary. Patient does have an HMO insurance and I did give him the number for customer service at [facility]. They do have private pay programs and can discuss in greater detail. He also stated that he was receiving a bill from [employer] for $900+, he felt this was the charge for the Zio patch. I advised that we only charge for placement and read. To contact Billing directly for a run down of that charge.

## 2017-08-24 ENCOUNTER — SLEEP CENTER VISIT (OUTPATIENT)
Dept: SLEEP MEDICINE | Facility: MEDICAL CENTER | Age: 62
End: 2017-08-24
Payer: COMMERCIAL

## 2017-08-24 VITALS
HEIGHT: 77 IN | BODY MASS INDEX: 32.35 KG/M2 | SYSTOLIC BLOOD PRESSURE: 130 MMHG | RESPIRATION RATE: 16 BRPM | WEIGHT: 274 LBS | HEART RATE: 83 BPM | DIASTOLIC BLOOD PRESSURE: 80 MMHG | OXYGEN SATURATION: 93 %

## 2017-08-24 DIAGNOSIS — G47.34 NOCTURNAL HYPOXIA: ICD-10-CM

## 2017-08-24 DIAGNOSIS — I48.92 ATRIAL FLUTTER, UNSPECIFIED TYPE (HCC): ICD-10-CM

## 2017-08-24 DIAGNOSIS — G47.33 OSA (OBSTRUCTIVE SLEEP APNEA): ICD-10-CM

## 2017-08-24 PROCEDURE — 99213 OFFICE O/P EST LOW 20 MIN: CPT | Performed by: INTERNAL MEDICINE

## 2017-08-24 RX ORDER — ZOLPIDEM TARTRATE 5 MG/1
5 TABLET ORAL NIGHTLY PRN
Qty: 3 TAB | Refills: 0 | Status: SHIPPED | OUTPATIENT
Start: 2017-08-24 | End: 2018-10-10

## 2017-08-24 NOTE — PROGRESS NOTES
"Chief Complaint   Patient presents with   • Follow-Up     OPO results       HPI: This patient is a 61 y.o. Male who returns for overnight oximetry results. He was evaluated by Dr. Porras on 02/16/17 and, to reiterate, has a history of atrial fibrillation, and was referred for possible sleep apnea. He denies snoring however feels that he is a \"shallow breather\". He wakes up feeling rested and denies any significant daytime hypersomnolence. He initially declined polysomnography, and had overnight oximetry performed which showed basal SPO2 of 86% with desaturations below 90% for 92% of the night, or 347 minutes, to a hugo of 71%. These findings are suspicious for sleep apnea.  We discussed the likelihood of sleep-disordered breathing, and correlation of TRESA with cardiac arrhythmias.      Past Medical History   Diagnosis Date   • Sepsis (CMS-HCC) 2009     secondary to perf'd bowel after colonscopy    • Nasal drainage    • Atrial fibrillation (CMS-HCC)    • Back pain    • Tonsillitis        Social History     Social History   • Marital Status:      Spouse Name: N/A   • Number of Children: N/A   • Years of Education: N/A     Occupational History   • Not on file.     Social History Main Topics   • Smoking status: Former Smoker     Quit date: 01/01/1977   • Smokeless tobacco: Never Used   • Alcohol Use: No   • Drug Use: No   • Sexual Activity: Not on file     Other Topics Concern   • Not on file     Social History Narrative       Family History   Problem Relation Age of Onset   • Heart Attack Father    • Sleep Apnea Neg Hx        Current Outpatient Prescriptions on File Prior to Visit   Medication Sig Dispense Refill   • RA KRILL OIL PO Take  by mouth.     • Cholecalciferol (VITAMIN D3 PO) Take  by mouth.     • MELATONIN PO Take  by mouth.     • IRON PO Take  by mouth.     • aspirin (ASPIRIN ADULT LOW DOSE) 81 MG EC tablet Take  by mouth.     • CREATINE MONOHYDRATE PO Take  by mouth.     • Multiple Vitamins-Minerals " "(CENTRUM SILVER ULTRA MENS) Tab Take 1 tablet by mouth every day.     • GARLIC PO Take 1 Cap by mouth every day.     • Omega-3 Fatty Acids (FISH OIL PO) Take 1 Cap by mouth every day.     • MAGNESIUM PO Take 1 Tab by mouth 2 Times a Day.     • Probiotic Cap Take 2 Caps by mouth every day.     • coenzyme Q-10 100 MG Cap capsule Take 200 mg by mouth every day.     • coenzyme Q-10 30 MG capsule Take 60 mg by mouth every day.     • flecainide (TAMBOCOR) 50 MG tablet Take 1 Tab by mouth 2 times a day. 180 Tab 3   • digoxin (LANOXIN) 250 MCG Tab Take 1 Tab by mouth every day. 90 Tab 3   • apixaban (ELIQUIS) 5mg Tab Take 1 Tab by mouth 2 Times a Day. 60 Tab 11   • Homeopathic Products (ZICAM COLD REMEDY PO) Take 1 Tab by mouth 2 times a day as needed (cold/ flu symptoms).       No current facility-administered medications on file prior to visit.       Allergies: Codeine    ROS:   Constitutional: Denies fevers, chills, night sweats, fatigue or weight loss  Eyes: Denies vision loss, pain, drainage, double vision  Ears, Nose, Throat: Denies earache, difficulty hearing, tinnitus, nasal congestion, hoarseness  Cardiovascular: Denies chest pain, tightness, palpitations, orthopnea or edema  Respiratory: Denies shortness of breath, cough, wheezing, hemoptysis  Sleep: Denies daytime sleepiness, snoring, apneas, insomnia, morning headaches  GI: Denies heartburn, dysphagia, nausea, abdominal pain, diarrhea or constipation  : Denies frequent urination, hematuria, discharge or painful urination  Musculoskeletal: Denies back pain, painful joints, sore muscles  Neurological: Denies weakness or headaches  Skin: No rashes    Blood pressure 130/80, pulse 83, resp. rate 16, height 1.956 m (6' 5\"), weight 124.286 kg (274 lb), SpO2 93 %.    Physical Exam:  Appearance: Well-nourished, well-developed, in no acute distress  HEENT: Normocephalic, atraumatic, white sclera, PERRLA, oropharynx clear  Neck: No adenopathy or masses  Respiratory: no " intercostal retractions or accessory muscle use  Lungs auscultation: Clear to auscultation bilaterally  Cardiovascular: Regular rate rhythm. No murmurs, rubs or gallops.  No LE edema  Abdomen: soft, nondistended  Gait: Normal  Digits: No clubbing, cyanosis  Motor: No focal deficits  Orientation: Oriented to time, person and place    Diagnosis:  1. Nocturnal hypoxia     2. TRESA (obstructive sleep apnea)  zolpidem (AMBIEN) 5 MG Tab    POLYSOMNOGRAPHY, 4 OR MORE   3. Atrial flutter, unspecified type (CMS-HCC)         Plan:  Although the patient has no symptoms of obstructive sleep apnea, he has evidence of significant nocturnal desaturations, and underlying cardiac arrhythmias. We discussed the correlation of sleep-disordered breathing and atrial flutter/fibrillation, and importance of screening for obstructive sleep apnea syndrome. After discussion he was amenable to polysomnography, which we will arrange in the sleep laboratory.  Return for after sleep study.

## 2017-08-24 NOTE — MR AVS SNAPSHOT
"        Benjamin John   2017 1:40 PM   Sleep Center Visit   MRN: [id number]    Department:  [facility]   Dept Phone:  [phone]    Description:  Male : 1955   Provider:  Ana Parker M.D.           Reason for Visit     Follow-Up OPO results      Allergies as of 2017     Allergen Noted Reactions    Codeine 2016   Vomiting, Nausea      You were diagnosed with     Nocturnal hypoxia   [988731]       TRESA (obstructive sleep apnea)   [714162]       Atrial flutter, unspecified type (CMS-HCC)   [1937719]         Vital Signs     Blood Pressure Pulse Respirations Height Weight Body Mass Index    130/80 mmHg 83 16 1.956 m (6' 5\") 124.286 kg (274 lb) 32.49 kg/m2    Oxygen Saturation Smoking Status                93% Former Smoker          Basic Information     Date Of Birth Sex Race Ethnicity Preferred Language    1955 Male White Non- English      Your appointments     Oct 25, 2017  8:10 PM   Sleep Study Diagnostic with SLEEP TECH   [facility] (--)    [address]  [address]  [email or web address] [address]   [phone]            Oct 30, 2017 10:00 AM   FOLLOW UP with Ray Woods M.D.   [facility] (--)    [address] [place] [address] [address]  [place] [address]   [phone]            Nov 15, 2017  8:20 AM   Follow UP with Abner Porras M.D.   [facility] (--)    [address]  [place] [address]   [phone]              Problem List              ICD-10-CM Priority Class Noted - Resolved    Atrial flutter with rapid ventricular response (CMS-HCC) I48.92   2016 - Present    Atrial flutter (CMS-HCC) I48.92   2016 - Present    Sleep apnea G47.30   2017 - Present      Health Maintenance        Date Due Completion Dates    IMM DTaP/Tdap/Td Vaccine (1 - Tdap) 1973 ---    COLONOSCOPY 2004 ---    IMM ZOSTER VACCINE 2014 ---    IMM INFLUENZA (1) 2017 " ---            Current Immunizations     No immunizations on file.      Below and/or attached are the medications your provider expects you to take. Review all of your home medications and newly ordered medications with your provider and/or pharmacist. Follow medication instructions as directed by your provider and/or pharmacist. Please keep your medication list with you and share with your provider. Update the information when medications are discontinued, doses are changed, or new medications (including over-the-counter products) are added; and carry medication information at all times in the event of emergency situations     Allergies:  CODEINE - Vomiting,Nausea               Medications  Valid as of: August 24, 2017 -  2:08 PM    Generic Name Brand Name Tablet Size Instructions for use    Apixaban (Tab) ELIQUIS 5mg Take 1 Tab by mouth 2 Times a Day.        Aspirin (Tablet Delayed Response) aspirin 81 MG Take  by mouth.        Cholecalciferol   Take  by mouth.        Coenzyme Q10 (Cap) coenzyme Q-10 30 MG Take 60 mg by mouth every day.        Coenzyme Q10 (Cap) coenzyme Q-10 100 MG Take 200 mg by mouth every day.        Creatine Monohydrate   Take  by mouth.        Digoxin (Tab) LANOXIN 250 MCG Take 1 Tab by mouth every day.        Flecainide Acetate (Tab) TAMBOCOR 50 MG Take 1 Tab by mouth 2 times a day.        Garlic   Take 1 Cap by mouth every day.        Homeopathic Products   Take 1 Tab by mouth 2 times a day as needed (cold/ flu symptoms).        Iron   Take  by mouth.        Krill Oil   Take  by mouth.        Magnesium   Take 1 Tab by mouth 2 Times a Day.        Melatonin   Take  by mouth.        Multiple Vitamins-Minerals (Tab) CENTRUM SILVER ULTRA MENS  Take 1 tablet by mouth every day.        Omega-3 Fatty Acids   Take 1 Cap by mouth every day.        Probiotic Product (Cap) Probiotic  Take 2 Caps by mouth every day.        Zolpidem Tartrate (Tab) AMBIEN 5 MG Take 1 Tab by mouth at bedtime as needed for  Sleep (1 to 3 po qhs prn insomnia/sleep study. Bring to sleep study.).        .                 Medicines prescribed today were sent to:     [facility]ACY #[id number] - [place], [place] - [address]    [address] [place] [address]    Phone: [phone] Fax: [phone]    Open 24 Hours?: No    [id number] - [place], [place] - [address]    [address] [place] [address]    Phone: [phone] Fax: [phone]    Open 24 Hours?: No      Medication refill instructions:       If your prescription bottle indicates you have medication refills left, it is not necessary to call your provider’s office. Please contact your pharmacy and they will refill your medication.    If your prescription bottle indicates you do not have any refills left, you may request refills at any time through one of the following ways: The online [employer] system (except Urgent Care), by calling your provider’s office, or by asking your pharmacy to contact your provider’s office with a refill request. Medication refills are processed only during regular business hours and may not be available until the next business day. Your provider may request additional information or to have a follow-up visit with you prior to refilling your medication.   *Please Note: Medication refills are assigned a new Rx number when refilled electronically. Your pharmacy may indicate that no refills were authorized even though a new prescription for the same medication is available at the pharmacy. Please request the medicine by name with the pharmacy before contacting your provider for a refill.        Your To Do List     Future Labs/Procedures Complete By Expires    POLYSOMNOGRAPHY, 4 OR MORE  As directed 8/24/2018         [employer] Access Code: Activation code not generated  Current [employer] Status: Active

## 2017-10-25 ENCOUNTER — SLEEP STUDY (OUTPATIENT)
Dept: SLEEP MEDICINE | Facility: MEDICAL CENTER | Age: 62
End: 2017-10-25
Attending: INTERNAL MEDICINE
Payer: COMMERCIAL

## 2017-10-25 DIAGNOSIS — G47.33 OSA (OBSTRUCTIVE SLEEP APNEA): ICD-10-CM

## 2017-10-25 PROCEDURE — 95811 POLYSOM 6/>YRS CPAP 4/> PARM: CPT | Performed by: INTERNAL MEDICINE

## 2017-10-26 NOTE — PROCEDURES
CLINICAL COMMENTS:  The patient underwent a split night polysomnogram with a CPAP titration using the standard montage for measurement of parameters of sleep, respiratory events, movement abnormalities, heart rate and rhythm. A microphone was used to monitor snoring.    ANALYSIS: The diagnostic recording time was 196.2 minutes with a sleep period of 169.6 minutes.  Total sleep time was 148.0 minutes with a sleep efficiency of 75.4%.  The sleep latency was 26.6 minutes, and REM latency was N/A minutes.  The patient had 45 arousals in total, for an arousal index of 18.2.        RESPIRATORY: The patient had 8 apneas in total.  Of these, 3 were obstructive apneas, and 5 were central apneas.  This resulted in an apnea index (AI) of 3.2.  The patient had 58 hypopneas in total, which resulted in a hypopnea index of 23.5.  The overall AHI was 26.8, while the AHI during REM was N/A.  The supine AHI = 26.8.    OXIMETRY: Oxygen saturation monitoring showed a mean SpO2 of 87.9% for the diagnostic part of the study, with a minimum oxygen saturation of 79.0%.  Oxygen saturations were below 89% for 118.9% of sleep time.    CARDIAC: The highest heart rate for the first part of the study was 88.0 beats per minute.  The average heart rate during sleep was 69.1 bpm, while the highest heart rate was 88.0 bpm.    LIMB MOVEMENTS: There were a total of 0 periodic limb movements during sleep, of which 0 were PLMS arousals.  This resulted in a PLMS index of 0.0 and a PLMS arousal index of 0.0.    TREATMENT    Treatment recording time was 281.6 minutes with a total sleep time of 226.0min.  The patient had an arousal index of 11.7.      RESPIRATORY: The patient had 0 obstructive apneas, 0 central apneas, and 11 hypopneas for an overall AHI was 2.9.    OXIMETRY: The mean SpO2 during treatment was 88.3%, with a minimum oxygen saturation of 85.0%.      Interpretation:    This is a split-night study.    In the diagnostic phase of his  fragmentation of sleep with prolonged sleep onset latency and an elevated arousal and awakening index. No REM sleep time was recorded. There are no periodic limb movements. The apnea hypopnea index is 26.8 events per hour, primarily including hypopnea episodes with occasional central and obstructive apneas. The entire diagnostic study was done in the supine body position. The lowest arterial oxygen saturation is 79% on room air and he spends about 82% of the diagnostic time with a saturation below 90%.    In the treatment phase of the study there is persistent fragmentation of sleep with increased wake after sleep onset time but significant REM sleep time is recorded. CPAP was adjusted across a pressure range of 5-11 cm water. Higher pressures were used to address residual hypopnea events in supine and REM sleep. At a pressure of 10 cm water, the apnea hypopnea index was 2.3 events per hour with a mean arterial oxygen saturation of 88% and a minimum saturation of 85%. That step in the titration included 29 minutes of REM sleep and 78 minutes of non-REM sleep time as well as time in the supine body position. CPAP was increased to 11 cm water the end of the study but that stage included only about a minute of non-REM sleep time.    Assessment:   Moderate to severe obstructive sleep apnea hypopnea with an apnea hypopnea index of 26.8 events per hour. Severe nocturnal hypoxemia with the lowest arterial oxygen saturation of 79% on room air. Fragmentation of sleep related to the breathing events and probably to laboratory effect as well. There is a good response to CPAP therapy but there is some residual hypoxemia at optimal pressure levels.    Recommendations:  CPAP at 10 cm water pressure. In addition to optimal CPAP therapy the patient may also require supplemental oxygen. Options include nocturnal oximetry on room air after the patienthas acclimated to the CPAP versus the addition now of low-flow oxygen therapy to  optimal CPAP pressures. He did best with a medium Simplus fullface mask.

## 2017-10-30 ENCOUNTER — OFFICE VISIT (OUTPATIENT)
Dept: CARDIOLOGY | Facility: MEDICAL CENTER | Age: 62
End: 2017-10-30
Payer: COMMERCIAL

## 2017-10-30 VITALS
SYSTOLIC BLOOD PRESSURE: 122 MMHG | WEIGHT: 274 LBS | DIASTOLIC BLOOD PRESSURE: 86 MMHG | BODY MASS INDEX: 32.35 KG/M2 | HEIGHT: 77 IN | OXYGEN SATURATION: 95 % | HEART RATE: 87 BPM

## 2017-10-30 DIAGNOSIS — I48.3 TYPICAL ATRIAL FLUTTER (HCC): ICD-10-CM

## 2017-10-30 LAB — EKG IMPRESSION: NORMAL

## 2017-10-30 PROCEDURE — 99214 OFFICE O/P EST MOD 30 MIN: CPT | Performed by: INTERNAL MEDICINE

## 2017-10-30 PROCEDURE — 93000 ELECTROCARDIOGRAM COMPLETE: CPT | Performed by: INTERNAL MEDICINE

## 2017-10-30 NOTE — PROGRESS NOTES
Subjective:   Benjamin John is a 62 y.o. male who presents today with h/o sustained atrial flutter with intercurrent illness. Nl echo and TFT's. Minimal palps.. Recent sleep study. No chest pain or SOB.    Past Medical History:   Diagnosis Date   • Atrial fibrillation (CMS-HCC)    • Back pain    • Nasal drainage    • Sepsis (CMS-HCC) 2009    secondary to perf'd bowel after colonscopy    • Tonsillitis      Past Surgical History:   Procedure Laterality Date   • CHOLECYSTECTOMY     • TONSILLECTOMY       Family History   Problem Relation Age of Onset   • Heart Attack Father    • Sleep Apnea Neg Hx      History   Smoking Status   • Former Smoker   • Quit date: 1/1/1977   Smokeless Tobacco   • Never Used     Allergies   Allergen Reactions   • Codeine Vomiting and Nausea     Outpatient Encounter Prescriptions as of 10/30/2017   Medication Sig Dispense Refill   • RA KRILL OIL PO Take  by mouth.     • Cholecalciferol (VITAMIN D3 PO) Take  by mouth.     • MELATONIN PO Take  by mouth.     • coenzyme Q-10 100 MG Cap capsule Take 200 mg by mouth every day.     • aspirin (ASPIRIN ADULT LOW DOSE) 81 MG EC tablet Take  by mouth.     • CREATINE MONOHYDRATE PO Take  by mouth.     • Multiple Vitamins-Minerals (CENTRUM SILVER ULTRA MENS) Tab Take 1 tablet by mouth every day.     • GARLIC PO Take 1 Cap by mouth every day.     • Omega-3 Fatty Acids (FISH OIL PO) Take 1 Cap by mouth every day.     • Probiotic Cap Take 2 Caps by mouth every day.     • zolpidem (AMBIEN) 5 MG Tab Take 1 Tab by mouth at bedtime as needed for Sleep (1 to 3 po qhs prn insomnia/sleep study. Bring to sleep study.). 3 Tab 0   • IRON PO Take  by mouth.     • coenzyme Q-10 30 MG capsule Take 60 mg by mouth every day.     • flecainide (TAMBOCOR) 50 MG tablet Take 1 Tab by mouth 2 times a day. 180 Tab 3   • digoxin (LANOXIN) 250 MCG Tab Take 1 Tab by mouth every day. 90 Tab 3   • apixaban (ELIQUIS) 5mg Tab Take 1 Tab by mouth 2 Times a Day. 60 Tab 11   •  "MAGNESIUM PO Take 1 Tab by mouth 2 Times a Day.     • Homeopathic Products (ZICAM COLD REMEDY PO) Take 1 Tab by mouth 2 times a day as needed (cold/ flu symptoms).       No facility-administered encounter medications on file as of 10/30/2017.      ROS     Objective:   /86   Pulse 87   Ht 1.956 m (6' 5.01\")   Wt 124.3 kg (274 lb)   SpO2 95%   BMI 32.48 kg/m²     Physical Exam   Constitutional: He is oriented to person, place, and time. He appears well-developed. No distress.   HENT:   Mouth/Throat: Mucous membranes are normal.   Eyes: Conjunctivae and EOM are normal.   Neck: No JVD present. No tracheal deviation present. No thyroid mass and no thyromegaly present.   Cardiovascular: Normal rate, regular rhythm and intact distal pulses.    No murmur heard.  Pulmonary/Chest: Effort normal and breath sounds normal. No respiratory distress. He exhibits no tenderness.   Abdominal: Soft. There is no tenderness.   Musculoskeletal: Normal range of motion. He exhibits no edema.   Neurological: He is alert and oriented to person, place, and time. He has normal strength. He displays no tremor.   Skin: Skin is warm and dry. He is not diaphoretic.   Psychiatric: He has a normal mood and affect. His behavior is normal.   Vitals reviewed.      Assessment:     1. Typical atrial flutter (CMS-HCC)         Medical Decision Making:  Today's Assessment / Status / Plan:   1. Atrial flutter no recurrence.  2. Possible sleep apnea work up in progress.  3. F/U in 1 year.  "

## 2017-10-30 NOTE — LETTER
[facility]   [address], [address]  [place] [address]  Phone: [phone]  Fax: [phone]              Benjamin John  1955    Encounter Date: 10/30/2017    Ray Woods M.D.          PROGRESS NOTE:  Subjective:   Benjamin John is a 62 y.o. male who presents today with h/o sustained atrial flutter with intercurrent illness. Nl echo and TFT's. Minimal palps.. Recent sleep study. No chest pain or SOB.    Past Medical History:   Diagnosis Date   • Atrial fibrillation (CMS-HCC)    • Back pain    • Nasal drainage    • Sepsis (CMS-HCC) 2009    secondary to perf'd bowel after colonscopy    • Tonsillitis      Past Surgical History:   Procedure Laterality Date   • CHOLECYSTECTOMY     • TONSILLECTOMY       Family History   Problem Relation Age of Onset   • Heart Attack Father    • Sleep Apnea Neg Hx      History   Smoking Status   • Former Smoker   • Quit date: 1/1/1977   Smokeless Tobacco   • Never Used     Allergies   Allergen Reactions   • Codeine Vomiting and Nausea     Outpatient Encounter Prescriptions as of 10/30/2017   Medication Sig Dispense Refill   • RA KRILL OIL PO Take  by mouth.     • Cholecalciferol (VITAMIN D3 PO) Take  by mouth.     • MELATONIN PO Take  by mouth.     • coenzyme Q-10 100 MG Cap capsule Take 200 mg by mouth every day.     • aspirin (ASPIRIN ADULT LOW DOSE) 81 MG EC tablet Take  by mouth.     • CREATINE MONOHYDRATE PO Take  by mouth.     • Multiple Vitamins-Minerals (CENTRUM SILVER ULTRA MENS) Tab Take 1 tablet by mouth every day.     • GARLIC PO Take 1 Cap by mouth every day.     • Omega-3 Fatty Acids (FISH OIL PO) Take 1 Cap by mouth every day.     • Probiotic Cap Take 2 Caps by mouth every day.     • zolpidem (AMBIEN) 5 MG Tab Take 1 Tab by mouth at bedtime as needed for Sleep (1 to 3 po qhs prn insomnia/sleep study. Bring to sleep study.). 3 Tab 0   • IRON PO Take  by mouth.     • coenzyme Q-10 30 MG capsule Take 60  "mg by mouth every day.     • flecainide (TAMBOCOR) 50 MG tablet Take 1 Tab by mouth 2 times a day. 180 Tab 3   • digoxin (LANOXIN) 250 MCG Tab Take 1 Tab by mouth every day. 90 Tab 3   • apixaban (ELIQUIS) 5mg Tab Take 1 Tab by mouth 2 Times a Day. 60 Tab 11   • MAGNESIUM PO Take 1 Tab by mouth 2 Times a Day.     • Homeopathic Products (ZICAM COLD REMEDY PO) Take 1 Tab by mouth 2 times a day as needed (cold/ flu symptoms).       No facility-administered encounter medications on file as of 10/30/2017.      ROS     Objective:   /86   Pulse 87   Ht 1.956 m (6' 5.01\")   Wt 124.3 kg (274 lb)   SpO2 95%   BMI 32.48 kg/m²      Physical Exam   Constitutional: He is oriented to person, place, and time. He appears well-developed. No distress.   HENT:   Mouth/Throat: Mucous membranes are normal.   Eyes: Conjunctivae and EOM are normal.   Neck: No JVD present. No tracheal deviation present. No thyroid mass and no thyromegaly present.   Cardiovascular: Normal rate, regular rhythm and intact distal pulses.    No murmur heard.  Pulmonary/Chest: Effort normal and breath sounds normal. No respiratory distress. He exhibits no tenderness.   Abdominal: Soft. There is no tenderness.   Musculoskeletal: Normal range of motion. He exhibits no edema.   Neurological: He is alert and oriented to person, place, and time. He has normal strength. He displays no tremor.   Skin: Skin is warm and dry. He is not diaphoretic.   Psychiatric: He has a normal mood and affect. His behavior is normal.   Vitals reviewed.      Assessment:     1. Typical atrial flutter (CMS-HCC)         Medical Decision Making:  Today's Assessment / Status / Plan:   1. Atrial flutter no recurrence.  2. Possible sleep apnea work up in progress.  3. F/U in 1 year.      Germán Arroyo D.O.  [address]  [address]  [place] [id number]  VIA Facsimile: [phone]                 "

## 2017-11-15 ENCOUNTER — SLEEP CENTER VISIT (OUTPATIENT)
Dept: SLEEP MEDICINE | Facility: MEDICAL CENTER | Age: 62
End: 2017-11-15
Payer: COMMERCIAL

## 2017-11-15 VITALS
OXYGEN SATURATION: 93 % | WEIGHT: 274 LBS | SYSTOLIC BLOOD PRESSURE: 110 MMHG | HEIGHT: 77 IN | RESPIRATION RATE: 16 BRPM | BODY MASS INDEX: 32.35 KG/M2 | HEART RATE: 72 BPM | DIASTOLIC BLOOD PRESSURE: 82 MMHG

## 2017-11-15 DIAGNOSIS — G47.33 OBSTRUCTIVE SLEEP APNEA HYPOPNEA, MODERATE: ICD-10-CM

## 2017-11-15 DIAGNOSIS — R09.02 HYPOXIA: ICD-10-CM

## 2017-11-15 PROCEDURE — 99214 OFFICE O/P EST MOD 30 MIN: CPT | Performed by: INTERNAL MEDICINE

## 2017-11-15 NOTE — PROGRESS NOTES
Benjamin John is a 62 y.o. male here for results of sleep study.    History of Present Illness:    The patient is a 62-year-old with nocturnal oxygen desaturations and atrial fibrillation. He underwent a sleep study which showed moderate obstructive sleep apnea with an apnea hypopnea index of 27 an hour and a low oxygen saturation of 78%. However his baseline oxygen saturation was between 88-90%. He is placed on CPAP during the second half the night and this was successful to 10 cm of water although oxygen saturations were still borderline at 85-89%. Patient has no history of lung disease and is a never smoker. 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 He has no history of asthma or COPD.    Constitutional:  Negative for fever, chills, sweats, and fatigue.  Eyes:  Negative for eye pain and visual changes.  HENT:  Negative for tinnitus and hoarse voice.  Cardiovascular:  Negative for chest pain, leg swelling, syncope and orthopnea.  Respiratory:  See HPI for pertinent negatives  Sleep:  Negative for somnolence, loud snoring, sleep disturbance due to breathing, insomnia.  Gastrointestinal:  Negative for dysphagia, nausea and abdominal pain.  Heme/lymph:  Denies easy bruising, blood clots.  Musculoskeletal:  Negative for arthralgias, sore muscles and back pain.  Skin:  Negative for rash and color change.  Neurological:  Negative for headaches, lightheadedness and weakness.  Psychiatric:  Denies depression.    Current Outpatient Prescriptions   Medication Sig Dispense Refill   • RA KRILL OIL PO Take  by mouth.     • Cholecalciferol (VITAMIN D3 PO) Take  by mouth.     • MELATONIN PO Take  by mouth.     • coenzyme Q-10 100 MG Cap capsule Take 200 mg by mouth every day.     • IRON PO Take  by mouth.     • aspirin (ASPIRIN ADULT LOW DOSE) 81 MG EC tablet Take  by mouth.     • CREATINE MONOHYDRATE PO Take  by mouth.     • Multiple Vitamins-Minerals (CENTRUM SILVER ULTRA  "MENS) Tab Take 1 tablet by mouth every day.     • GARLIC PO Take 1 Cap by mouth every day.     • Omega-3 Fatty Acids (FISH OIL PO) Take 1 Cap by mouth every day.     • MAGNESIUM PO Take 1 Tab by mouth 2 Times a Day.     • Probiotic Cap Take 2 Caps by mouth every day.     • Homeopathic Products (ZICAM COLD REMEDY PO) Take 1 Tab by mouth 2 times a day as needed (cold/ flu symptoms).     • zolpidem (AMBIEN) 5 MG Tab Take 1 Tab by mouth at bedtime as needed for Sleep (1 to 3 po qhs prn insomnia/sleep study. Bring to sleep study.). 3 Tab 0   • coenzyme Q-10 30 MG capsule Take 60 mg by mouth every day.     • flecainide (TAMBOCOR) 50 MG tablet Take 1 Tab by mouth 2 times a day. 180 Tab 3   • digoxin (LANOXIN) 250 MCG Tab Take 1 Tab by mouth every day. 90 Tab 3   • apixaban (ELIQUIS) 5mg Tab Take 1 Tab by mouth 2 Times a Day. 60 Tab 11     No current facility-administered medications for this visit.        Social History   Substance Use Topics   • Smoking status: Former Smoker     Quit date: 1/1/1977   • Smokeless tobacco: Never Used   • Alcohol use No       Past Medical History:   Diagnosis Date   • Atrial fibrillation (CMS-HCC)    • Back pain    • Nasal drainage    • Sepsis (CMS-HCC) 2009    secondary to perf'd bowel after colonscopy    • Tonsillitis        Past Surgical History:   Procedure Laterality Date   • CHOLECYSTECTOMY     • TONSILLECTOMY         Allergies:  Codeine    Family History   Problem Relation Age of Onset   • Heart Attack Father    • Sleep Apnea Neg Hx        Physical Examination    Vitals:    11/15/17 0817   Height: 1.956 m (6' 5\")   Weight: 124.3 kg (274 lb)   Weight % change since last entry.: 0 %   BP: 110/82   Pulse: 72   BMI (Calculated): 32.49   Resp: 16   O2 sat % room air: 93 %       Physical Exam:  Constitutional:  Well developed and well nourished.  Head:  Normocephalic and atraumatic.  Nose:  Nose normal.  Mouth/Throat:  Oropharynx is clear and moist, no lesions.    Neck:  Normal range of " motion.  Supple.  No JVD.  Cardiovascular:  Normal rate, regular rhythm, normal heart sounds. No edema  Pulmonary/Chest: No wheezing, rales or rhonchi.  Respiratory effort non labored  Musculoskeletal.  No muscular atrophy.  Lymphadenopathy:  No cervical or supraclavicular adenopathy  Neurological:  Alert and oriented.  Cranial nerves intact.  No focal deficits  Skin:  No rashes or ulcers.  Psyciatric:  Normal mood and affect.      Assessment and Plan:  1. Obstructive sleep apnea hypopnea, moderate  I set the patient with CPAP at 10 cm of water. We will check an overnight oximetry on the CPAP to make sure his saturations are adequate at night he may need supplemental oxygen with the CPAP. We'll plan to see him back in a few months to go over the results of his data card and his compliance  - DME CPAP    2. Hypoxia  The patient seems to be relatively hypoxemic despite no history of lung disease. I ordered pulmonary function testing and a chest x-ray for further evaluation and we'll see him back in a few months.  - AMB PULMONARY FUNCTION TEST/LAB; Future  - DX-CHEST-2 VIEWS; Future        Followup Return in about 2 months (around 1/15/2018) for follow up with the pulmonary physician, follow up visit with BENIGNO.

## 2017-11-16 ENCOUNTER — TELEPHONE (OUTPATIENT)
Dept: PULMONOLOGY | Facility: HOSPICE | Age: 62
End: 2017-11-16

## 2017-11-16 NOTE — TELEPHONE ENCOUNTER
Pt called to inform Dr. Porras that he is able to have the testing done but isn't able to get in to be seen until March/ 2018 (no appt on file yet).  I advise the pt to have the test done, call in for the results and once he receives and gets established on his CPAP machine we could put in a request for other orders.

## 2017-12-01 ENCOUNTER — HOSPITAL ENCOUNTER (OUTPATIENT)
Dept: RADIOLOGY | Facility: MEDICAL CENTER | Age: 62
End: 2017-12-01
Attending: INTERNAL MEDICINE
Payer: COMMERCIAL

## 2017-12-01 ENCOUNTER — NON-PROVIDER VISIT (OUTPATIENT)
Dept: PULMONOLOGY | Facility: HOSPICE | Age: 62
End: 2017-12-01
Payer: COMMERCIAL

## 2017-12-01 DIAGNOSIS — R09.02 HYPOXIA: ICD-10-CM

## 2017-12-01 PROCEDURE — 71020 DX-CHEST-2 VIEWS: CPT

## 2017-12-01 PROCEDURE — 94729 DIFFUSING CAPACITY: CPT | Performed by: INTERNAL MEDICINE

## 2017-12-01 PROCEDURE — 94060 EVALUATION OF WHEEZING: CPT | Performed by: INTERNAL MEDICINE

## 2017-12-01 PROCEDURE — 94726 PLETHYSMOGRAPHY LUNG VOLUMES: CPT | Performed by: INTERNAL MEDICINE

## 2017-12-01 ASSESSMENT — PULMONARY FUNCTION TESTS
FEV1/FVC: 79
FEV1_PERCENT_CHANGE: -1
FEV1/FVC_PERCENT_PREDICTED: 105
FEV1/FVC: 79.17
FEV1_PREDICTED: 4.44
FEV1_PERCENT_PREDICTED: 98
FEV1/FVC_PERCENT_CHANGE: 0
FVC_PREDICTED: 5.9
FEV1/FVC_PERCENT_PREDICTED: 75
FEV1: 4.4
FEV1_PERCENT_PREDICTED: 99
FVC: 5.57
FVC_PERCENT_PREDICTED: 94
FEV1/FVC_PERCENT_PREDICTED: 105
FVC: 5.52
FEV1_PERCENT_CHANGE: 0
FVC_PERCENT_PREDICTED: 93
FEV1: 4.37

## 2017-12-01 NOTE — PROCEDURES
Technician: Jakob Aparicio RRT/CPCURTIS  Good patient effort & cooperation.  The results of this test meet the ATS standards for acceptability and repeatability.  The DLCO was uncorrected for Hgb.  A bronchodilator of Ventolin HFA- 2 puffs via spacer  Administered.    SPIROMETRY:  1. FVC was 5.57L, 94 % of predicted  2. FEV1 was 4.40 L, 99 % of predicted   3. FEV1/FVC ratio was 79 %  4. There was no significant response to bronchodilators   5. Flow volume loop normal shape and size     LUNG VOLUMES:  1. TLC was 106 % of predicted   2. RV was  119 % of predicted     DIFFUSION CAPACITY:  1.Diffusion capacity was 126 % of predicted       IMPRESSION:  The patient has normal pulmonary function test. Clinical correlation is required.

## 2018-01-10 ENCOUNTER — TELEPHONE (OUTPATIENT)
Dept: PULMONOLOGY | Facility: HOSPICE | Age: 63
End: 2018-01-10

## 2018-01-11 NOTE — TELEPHONE ENCOUNTER
Pt called and left vm requesting that his DME orders be sent to [facility], states that his insurance comp changed to [facility]PN.

## 2018-09-08 ENCOUNTER — OFFICE VISIT (OUTPATIENT)
Dept: URGENT CARE | Facility: CLINIC | Age: 63
End: 2018-09-08
Payer: COMMERCIAL

## 2018-09-08 VITALS
OXYGEN SATURATION: 95 % | HEIGHT: 77 IN | BODY MASS INDEX: 33.77 KG/M2 | SYSTOLIC BLOOD PRESSURE: 110 MMHG | WEIGHT: 286 LBS | DIASTOLIC BLOOD PRESSURE: 80 MMHG | HEART RATE: 80 BPM | RESPIRATION RATE: 16 BRPM | TEMPERATURE: 97.6 F

## 2018-09-08 DIAGNOSIS — S61.219A LACERATION OF FINGER WITH INFECTION, INITIAL ENCOUNTER: ICD-10-CM

## 2018-09-08 DIAGNOSIS — L08.9 LACERATION OF FINGER WITH INFECTION, INITIAL ENCOUNTER: ICD-10-CM

## 2018-09-08 PROCEDURE — 90471 IMMUNIZATION ADMIN: CPT | Performed by: PHYSICIAN ASSISTANT

## 2018-09-08 PROCEDURE — 99213 OFFICE O/P EST LOW 20 MIN: CPT | Mod: 25 | Performed by: PHYSICIAN ASSISTANT

## 2018-09-08 PROCEDURE — 90715 TDAP VACCINE 7 YRS/> IM: CPT | Performed by: PHYSICIAN ASSISTANT

## 2018-09-08 RX ORDER — ATORVASTATIN CALCIUM 20 MG/1
TABLET, FILM COATED ORAL
Refills: 0 | COMMUNITY
Start: 2018-08-06 | End: 2018-10-10

## 2018-09-08 RX ORDER — CLINDAMYCIN HYDROCHLORIDE 300 MG/1
300 CAPSULE ORAL 3 TIMES DAILY
Qty: 21 CAP | Refills: 0 | Status: SHIPPED | OUTPATIENT
Start: 2018-09-08 | End: 2018-09-15

## 2018-09-08 RX ORDER — CEFUROXIME AXETIL 500 MG/1
500 TABLET ORAL 2 TIMES DAILY
Qty: 10 TAB | Refills: 0 | Status: SHIPPED | OUTPATIENT
Start: 2018-09-08 | End: 2018-09-13

## 2018-09-08 RX ORDER — DEXAMETHASONE 4 MG/1
8 TABLET ORAL DAILY
Qty: 4 TAB | Refills: 0 | Status: SHIPPED | OUTPATIENT
Start: 2018-09-08 | End: 2018-09-10

## 2018-09-08 ASSESSMENT — ENCOUNTER SYMPTOMS
CONSTITUTIONAL NEGATIVE: 1
MUSCULOSKELETAL NEGATIVE: 1
NEUROLOGICAL NEGATIVE: 1

## 2018-09-08 NOTE — PROGRESS NOTES
Subjective:      Benjamin John is a 62 y.o. male who presents with Laceration (index finger on right hand, yesterday, need tdap vaccine, swollen and suspects infection, hurts to move)            Laceration    The incident occurred 12 to 24 hours ago (finger lac; old; infected). The laceration is located on the left hand. The laceration is 1 cm in size. The laceration mechanism was a metal edge. The pain is moderate. The pain has been constant since onset. He reports no foreign bodies present. His tetanus status is unknown.       Review of Systems   Constitutional: Negative.    Musculoskeletal: Negative.    Skin: Negative.    Neurological: Negative.           Objective:     There were no vitals taken for this visit.     Physical Exam   Constitutional: He is oriented to person, place, and time. He appears well-developed and well-nourished. No distress.   Musculoskeletal: He exhibits tenderness (finger lac area, redn/sw/tend). He exhibits no edema.   Neurological: He is alert and oriented to person, place, and time. No sensory deficit. He exhibits normal muscle tone. Coordination normal.   Skin: Skin is warm and dry. Capillary refill takes less than 2 seconds. There is erythema.   Psychiatric: He has a normal mood and affect. His behavior is normal.   Nursing note and vitals reviewed.    Active Ambulatory Problems     Diagnosis Date Noted   • Atrial flutter (HCC) 11/18/2016   • Sleep apnea 04/26/2017     Resolved Ambulatory Problems     Diagnosis Date Noted   • Atrial flutter with rapid ventricular response (HCC) 11/12/2016     Past Medical History:   Diagnosis Date   • Atrial fibrillation (HCC)    • Back pain    • Nasal drainage    • Sepsis ([facility]) 2009   • Tonsillitis      Current Outpatient Prescriptions on File Prior to Visit   Medication Sig Dispense Refill   • RA KRILL OIL PO Take  by mouth.     • Cholecalciferol (VITAMIN D3 PO) Take  by mouth.     • MELATONIN PO Take  by mouth.     • coenzyme Q-10 100 MG  Cap capsule Take 200 mg by mouth every day.     • IRON PO Take  by mouth.     • aspirin (ASPIRIN ADULT LOW DOSE) 81 MG EC tablet Take  by mouth.     • Multiple Vitamins-Minerals (CENTRUM SILVER ULTRA MENS) Tab Take 1 tablet by mouth every day.     • GARLIC PO Take 1 Cap by mouth every day.     • Omega-3 Fatty Acids (FISH OIL PO) Take 1 Cap by mouth every day.     • Probiotic Cap Take 2 Caps by mouth every day.     • zolpidem (AMBIEN) 5 MG Tab Take 1 Tab by mouth at bedtime as needed for Sleep (1 to 3 po qhs prn insomnia/sleep study. Bring to sleep study.). 3 Tab 0   • coenzyme Q-10 30 MG capsule Take 60 mg by mouth every day.     • flecainide (TAMBOCOR) 50 MG tablet Take 1 Tab by mouth 2 times a day. 180 Tab 3   • digoxin (LANOXIN) 250 MCG Tab Take 1 Tab by mouth every day. 90 Tab 3   • CREATINE MONOHYDRATE PO Take  by mouth.     • apixaban (ELIQUIS) 5mg Tab Take 1 Tab by mouth 2 Times a Day. 60 Tab 11   • MAGNESIUM PO Take 1 Tab by mouth 2 Times a Day.     • Homeopathic Products (ZICAM COLD REMEDY PO) Take 1 Tab by mouth 2 times a day as needed (cold/ flu symptoms).       No current facility-administered medications on file prior to visit.      Social History     Social History   • Marital status:      Spouse name: N/A   • Number of children: N/A   • Years of education: N/A     Occupational History   • Not on file.     Social History Main Topics   • Smoking status: Former Smoker     Quit date: 1/1/1977   • Smokeless tobacco: Never Used   • Alcohol use No   • Drug use: No   • Sexual activity: Not on file     Other Topics Concern   • Not on file     Social History Narrative   • No narrative on file     Family History   Problem Relation Age of Onset   • Heart Attack Father    • Sleep Apnea Neg Hx      Codeine            Assessment/Plan:     ·  finger lac, skin infect.      · rx abx; soaks

## 2018-10-03 ENCOUNTER — HOSPITAL ENCOUNTER (OUTPATIENT)
Facility: MEDICAL CENTER | Age: 63
End: 2018-10-03
Attending: PHYSICIAN ASSISTANT
Payer: COMMERCIAL

## 2018-10-03 ENCOUNTER — OFFICE VISIT (OUTPATIENT)
Dept: URGENT CARE | Facility: CLINIC | Age: 63
End: 2018-10-03
Payer: COMMERCIAL

## 2018-10-03 VITALS
TEMPERATURE: 97.7 F | RESPIRATION RATE: 16 BRPM | HEIGHT: 77 IN | BODY MASS INDEX: 33.04 KG/M2 | OXYGEN SATURATION: 95 % | HEART RATE: 77 BPM | WEIGHT: 279.8 LBS | SYSTOLIC BLOOD PRESSURE: 120 MMHG | DIASTOLIC BLOOD PRESSURE: 80 MMHG

## 2018-10-03 DIAGNOSIS — L50.9 URTICARIAL RASH: ICD-10-CM

## 2018-10-03 LAB
FORWARD REASON: SPWHY: NORMAL
FORWARDED TO LAB: SPWHR: NORMAL
SPECIMEN SENT: SPWT1: NORMAL

## 2018-10-03 PROCEDURE — 99000 SPECIMEN HANDLING OFFICE-LAB: CPT | Performed by: PHYSICIAN ASSISTANT

## 2018-10-03 PROCEDURE — 99214 OFFICE O/P EST MOD 30 MIN: CPT | Performed by: PHYSICIAN ASSISTANT

## 2018-10-03 RX ORDER — CEFUROXIME AXETIL 500 MG/1
500 TABLET ORAL 2 TIMES DAILY
Qty: 10 TAB | Refills: 0 | Status: SHIPPED | OUTPATIENT
Start: 2018-10-03 | End: 2018-10-08

## 2018-10-03 RX ORDER — SULFAMETHOXAZOLE AND TRIMETHOPRIM 800; 160 MG/1; MG/1
1 TABLET ORAL 2 TIMES DAILY
Qty: 20 TAB | Refills: 0 | Status: SHIPPED | OUTPATIENT
Start: 2018-10-03 | End: 2018-10-13

## 2018-10-03 ASSESSMENT — ENCOUNTER SYMPTOMS
NEUROLOGICAL NEGATIVE: 1
CONSTITUTIONAL NEGATIVE: 1
MUSCULOSKELETAL NEGATIVE: 1

## 2018-10-03 NOTE — PROGRESS NOTES
"Subjective:      Benjamin John is a 63 y.o. male who presents with Rash (x 5 days, itchy rash on Rt.lower leg, little discharge)            Rash   This is a new (itchy rash r lower leg; some dc) problem. The current episode started yesterday. The problem is unchanged. The affected locations include the right lower leg. The rash is characterized by redness and itchiness. It is unknown if there was an exposure to a precipitant. Pertinent negatives include no joint pain. Past treatments include nothing. The treatment provided no relief. There is no history of allergies.       Review of Systems   Constitutional: Negative.    Musculoskeletal: Negative.  Negative for joint pain.   Skin: Positive for rash.   Neurological: Negative.           Objective:     /80 (BP Location: Left arm, Patient Position: Sitting, BP Cuff Size: Large adult)   Pulse 77   Temp 36.5 °C (97.7 °F) (Temporal)   Resp 16   Ht 1.956 m (6' 5\")   Wt (!) 126.9 kg (279 lb 12.8 oz)   SpO2 95%   BMI 33.18 kg/m²      Physical Exam   Constitutional: He is oriented to person, place, and time. He appears well-developed and well-nourished. No distress.   Musculoskeletal: Normal range of motion. He exhibits no edema or tenderness.   Neurological: He is alert and oriented to person, place, and time. No sensory deficit. He exhibits normal muscle tone. Coordination normal.   Skin: Skin is warm and dry. Capillary refill takes less than 2 seconds. Rash noted. There is erythema (r lower inner leg, patch of redn/rash, yl crusting; no swell).   Psychiatric: He has a normal mood and affect. His behavior is normal.   Nursing note and vitals reviewed.    Active Ambulatory Problems     Diagnosis Date Noted   • Atrial flutter (HCC) 11/18/2016   • Sleep apnea 04/26/2017     Resolved Ambulatory Problems     Diagnosis Date Noted   • Atrial flutter with rapid ventricular response (HCC) 11/12/2016     Past Medical History:   Diagnosis Date   • Atrial " fibrillation ([facility])    • Back pain    • Nasal drainage    • Sepsis ([facility]) 2009   • Tonsillitis      Current Outpatient Prescriptions on File Prior to Visit   Medication Sig Dispense Refill   • atorvastatin (LIPITOR) 20 MG Tab TK 1 T PO D  0   • Cholecalciferol (VITAMIN D3 PO) Take  by mouth.     • MELATONIN PO Take  by mouth.     • coenzyme Q-10 100 MG Cap capsule Take 200 mg by mouth every day.     • aspirin (ASPIRIN ADULT LOW DOSE) 81 MG EC tablet Take  by mouth.     • Multiple Vitamins-Minerals (CENTRUM SILVER ULTRA MENS) Tab Take 1 tablet by mouth every day.     • GARLIC PO Take 1 Cap by mouth every day.     • Omega-3 Fatty Acids (FISH OIL PO) Take 1 Cap by mouth every day.     • Probiotic Cap Take 2 Caps by mouth every day.     • zolpidem (AMBIEN) 5 MG Tab Take 1 Tab by mouth at bedtime as needed for Sleep (1 to 3 po qhs prn insomnia/sleep study. Bring to sleep study.). 3 Tab 0   • RA KRILL OIL PO Take  by mouth.     • IRON PO Take  by mouth.     • coenzyme Q-10 30 MG capsule Take 60 mg by mouth every day.     • flecainide (TAMBOCOR) 50 MG tablet Take 1 Tab by mouth 2 times a day. 180 Tab 3   • digoxin (LANOXIN) 250 MCG Tab Take 1 Tab by mouth every day. 90 Tab 3   • CREATINE MONOHYDRATE PO Take  by mouth.     • apixaban (ELIQUIS) 5mg Tab Take 1 Tab by mouth 2 Times a Day. 60 Tab 11   • MAGNESIUM PO Take 1 Tab by mouth 2 Times a Day.     • Homeopathic Products (ZICAM COLD REMEDY PO) Take 1 Tab by mouth 2 times a day as needed (cold/ flu symptoms).       No current facility-administered medications on file prior to visit.      Social History     Social History   • Marital status:      Spouse name: N/A   • Number of children: N/A   • Years of education: N/A     Occupational History   • Not on file.     Social History Main Topics   • Smoking status: Former Smoker     Quit date: 1/1/1977   • Smokeless tobacco: Never Used   • Alcohol use No   • Drug use: No   • Sexual activity: Not on file     Other Topics  Concern   • Not on file     Social History Narrative   • No narrative on file     Family History   Problem Relation Age of Onset   • Heart Attack Father    • Sleep Apnea Neg Hx      Codeine              Assessment/Plan:     ·  r lower leg rash      · rx meds; topicals

## 2018-10-10 ENCOUNTER — HOSPITAL ENCOUNTER (INPATIENT)
Facility: MEDICAL CENTER | Age: 63
LOS: 1 days | DRG: 565 | End: 2018-10-11
Attending: EMERGENCY MEDICINE | Admitting: SURGERY
Payer: COMMERCIAL

## 2018-10-10 ENCOUNTER — APPOINTMENT (OUTPATIENT)
Dept: RADIOLOGY | Facility: MEDICAL CENTER | Age: 63
DRG: 565 | End: 2018-10-10
Attending: EMERGENCY MEDICINE
Payer: COMMERCIAL

## 2018-10-10 DIAGNOSIS — T14.90XA TRAUMA: ICD-10-CM

## 2018-10-10 PROBLEM — S22.20XA STERNAL FRACTURE: Status: ACTIVE | Noted: 2018-10-10

## 2018-10-10 PROBLEM — S20.219A CHEST WALL CONTUSION: Status: ACTIVE | Noted: 2018-10-10

## 2018-10-10 PROBLEM — L03.90 CELLULITIS: Status: ACTIVE | Noted: 2018-10-10

## 2018-10-10 LAB
ABO GROUP BLD: NORMAL
ALBUMIN SERPL BCP-MCNC: 5 G/DL (ref 3.2–4.9)
ALBUMIN/GLOB SERPL: 2.3 G/DL
ALP SERPL-CCNC: 68 U/L (ref 30–99)
ALT SERPL-CCNC: 55 U/L (ref 2–50)
ANION GAP SERPL CALC-SCNC: 10 MMOL/L (ref 0–11.9)
APTT PPP: 27 SEC (ref 24.7–36)
AST SERPL-CCNC: 37 U/L (ref 12–45)
BASOPHILS # BLD AUTO: 0.7 % (ref 0–1.8)
BASOPHILS # BLD: 0.07 K/UL (ref 0–0.12)
BILIRUB SERPL-MCNC: 1 MG/DL (ref 0.1–1.5)
BLD GP AB SCN SERPL QL: NORMAL
BUN SERPL-MCNC: 20 MG/DL (ref 8–22)
CALCIUM SERPL-MCNC: 9.9 MG/DL (ref 8.5–10.5)
CHLORIDE SERPL-SCNC: 104 MMOL/L (ref 96–112)
CO2 SERPL-SCNC: 23 MMOL/L (ref 20–33)
CREAT SERPL-MCNC: 1.27 MG/DL (ref 0.5–1.4)
EOSINOPHIL # BLD AUTO: 0.12 K/UL (ref 0–0.51)
EOSINOPHIL NFR BLD: 1.1 % (ref 0–6.9)
ERYTHROCYTE [DISTWIDTH] IN BLOOD BY AUTOMATED COUNT: 38 FL (ref 35.9–50)
ETHANOL BLD-MCNC: 0 G/DL
GLOBULIN SER CALC-MCNC: 2.2 G/DL (ref 1.9–3.5)
GLUCOSE BLD-MCNC: 111 MG/DL (ref 65–99)
GLUCOSE SERPL-MCNC: 129 MG/DL (ref 65–99)
HCT VFR BLD AUTO: 45.3 % (ref 42–52)
HGB BLD-MCNC: 15.5 G/DL (ref 14–18)
IMM GRANULOCYTES # BLD AUTO: 0.04 K/UL (ref 0–0.11)
IMM GRANULOCYTES NFR BLD AUTO: 0.4 % (ref 0–0.9)
INR PPP: 1.09 (ref 0.87–1.13)
LYMPHOCYTES # BLD AUTO: 1.22 K/UL (ref 1–4.8)
LYMPHOCYTES NFR BLD: 11.7 % (ref 22–41)
MCH RBC QN AUTO: 29 PG (ref 27–33)
MCHC RBC AUTO-ENTMCNC: 34.2 G/DL (ref 33.7–35.3)
MCV RBC AUTO: 84.8 FL (ref 81.4–97.8)
MONOCYTES # BLD AUTO: 0.55 K/UL (ref 0–0.85)
MONOCYTES NFR BLD AUTO: 5.3 % (ref 0–13.4)
NEUTROPHILS # BLD AUTO: 8.47 K/UL (ref 1.82–7.42)
NEUTROPHILS NFR BLD: 80.8 % (ref 44–72)
NRBC # BLD AUTO: 0 K/UL
NRBC BLD-RTO: 0 /100 WBC
PLATELET # BLD AUTO: 229 K/UL (ref 164–446)
PMV BLD AUTO: 9 FL (ref 9–12.9)
POTASSIUM SERPL-SCNC: 4.1 MMOL/L (ref 3.6–5.5)
PROT SERPL-MCNC: 7.2 G/DL (ref 6–8.2)
PROTHROMBIN TIME: 14.2 SEC (ref 12–14.6)
RBC # BLD AUTO: 5.34 M/UL (ref 4.7–6.1)
RH BLD: NORMAL
SODIUM SERPL-SCNC: 137 MMOL/L (ref 135–145)
WBC # BLD AUTO: 10.5 K/UL (ref 4.8–10.8)

## 2018-10-10 PROCEDURE — 86900 BLOOD TYPING SEROLOGIC ABO: CPT

## 2018-10-10 PROCEDURE — 82962 GLUCOSE BLOOD TEST: CPT

## 2018-10-10 PROCEDURE — 770020 HCHG ROOM/CARE - TELE (206)

## 2018-10-10 PROCEDURE — 86901 BLOOD TYPING SEROLOGIC RH(D): CPT

## 2018-10-10 PROCEDURE — 72131 CT LUMBAR SPINE W/O DYE: CPT

## 2018-10-10 PROCEDURE — 71045 X-RAY EXAM CHEST 1 VIEW: CPT

## 2018-10-10 PROCEDURE — 700112 HCHG RX REV CODE 229: Performed by: NURSE PRACTITIONER

## 2018-10-10 PROCEDURE — 86850 RBC ANTIBODY SCREEN: CPT

## 2018-10-10 PROCEDURE — 85025 COMPLETE CBC W/AUTO DIFF WBC: CPT

## 2018-10-10 PROCEDURE — 307740 HCHG GREEN TRAUMA TEAM SERVICES

## 2018-10-10 PROCEDURE — A9270 NON-COVERED ITEM OR SERVICE: HCPCS | Performed by: NURSE PRACTITIONER

## 2018-10-10 PROCEDURE — 99285 EMERGENCY DEPT VISIT HI MDM: CPT

## 2018-10-10 PROCEDURE — 700111 HCHG RX REV CODE 636 W/ 250 OVERRIDE (IP): Performed by: NURSE PRACTITIONER

## 2018-10-10 PROCEDURE — 72128 CT CHEST SPINE W/O DYE: CPT

## 2018-10-10 PROCEDURE — 80053 COMPREHEN METABOLIC PANEL: CPT

## 2018-10-10 PROCEDURE — 700117 HCHG RX CONTRAST REV CODE 255: Performed by: EMERGENCY MEDICINE

## 2018-10-10 PROCEDURE — 700111 HCHG RX REV CODE 636 W/ 250 OVERRIDE (IP): Performed by: EMERGENCY MEDICINE

## 2018-10-10 PROCEDURE — 94760 N-INVAS EAR/PLS OXIMETRY 1: CPT

## 2018-10-10 PROCEDURE — 96374 THER/PROPH/DIAG INJ IV PUSH: CPT

## 2018-10-10 PROCEDURE — 85730 THROMBOPLASTIN TIME PARTIAL: CPT

## 2018-10-10 PROCEDURE — 71260 CT THORAX DX C+: CPT

## 2018-10-10 PROCEDURE — 72170 X-RAY EXAM OF PELVIS: CPT

## 2018-10-10 PROCEDURE — 85610 PROTHROMBIN TIME: CPT

## 2018-10-10 PROCEDURE — 80307 DRUG TEST PRSMV CHEM ANLYZR: CPT

## 2018-10-10 PROCEDURE — 700102 HCHG RX REV CODE 250 W/ 637 OVERRIDE(OP): Performed by: NURSE PRACTITIONER

## 2018-10-10 RX ORDER — PHENOL 1.4 %
10 AEROSOL, SPRAY (ML) MUCOUS MEMBRANE
COMMUNITY

## 2018-10-10 RX ORDER — DOCUSATE SODIUM 100 MG/1
100 CAPSULE, LIQUID FILLED ORAL 2 TIMES DAILY
Status: DISCONTINUED | OUTPATIENT
Start: 2018-10-10 | End: 2018-10-11 | Stop reason: HOSPADM

## 2018-10-10 RX ORDER — GABAPENTIN 100 MG/1
100 CAPSULE ORAL 3 TIMES DAILY
Status: DISCONTINUED | OUTPATIENT
Start: 2018-10-10 | End: 2018-10-11 | Stop reason: HOSPADM

## 2018-10-10 RX ORDER — FAMOTIDINE 20 MG/1
20 TABLET, FILM COATED ORAL 2 TIMES DAILY
Status: DISCONTINUED | OUTPATIENT
Start: 2018-10-10 | End: 2018-10-11

## 2018-10-10 RX ORDER — AMOXICILLIN AND CLAVULANATE POTASSIUM 875; 125 MG/1; MG/1
1 TABLET, FILM COATED ORAL ONCE
Status: DISCONTINUED | OUTPATIENT
Start: 2018-10-10 | End: 2018-10-10 | Stop reason: CLARIF

## 2018-10-10 RX ORDER — ENEMA 19; 7 G/133ML; G/133ML
1 ENEMA RECTAL
Status: DISCONTINUED | OUTPATIENT
Start: 2018-10-10 | End: 2018-10-11 | Stop reason: HOSPADM

## 2018-10-10 RX ORDER — SULFAMETHOXAZOLE AND TRIMETHOPRIM 800; 160 MG/1; MG/1
1 TABLET ORAL 2 TIMES DAILY
Status: DISCONTINUED | OUTPATIENT
Start: 2018-10-10 | End: 2018-10-11 | Stop reason: HOSPADM

## 2018-10-10 RX ORDER — ACETAMINOPHEN 500 MG
1000 TABLET ORAL EVERY 6 HOURS
Status: DISCONTINUED | OUTPATIENT
Start: 2018-10-10 | End: 2018-10-11

## 2018-10-10 RX ORDER — ATORVASTATIN CALCIUM 20 MG/1
20 TABLET, FILM COATED ORAL NIGHTLY
COMMUNITY

## 2018-10-10 RX ORDER — BISACODYL 10 MG
10 SUPPOSITORY, RECTAL RECTAL
Status: DISCONTINUED | OUTPATIENT
Start: 2018-10-10 | End: 2018-10-11 | Stop reason: HOSPADM

## 2018-10-10 RX ORDER — ONDANSETRON 2 MG/ML
4 INJECTION INTRAMUSCULAR; INTRAVENOUS EVERY 4 HOURS PRN
Status: DISCONTINUED | OUTPATIENT
Start: 2018-10-10 | End: 2018-10-11 | Stop reason: HOSPADM

## 2018-10-10 RX ORDER — HYDROMORPHONE HYDROCHLORIDE 2 MG/ML
0.5 INJECTION, SOLUTION INTRAMUSCULAR; INTRAVENOUS; SUBCUTANEOUS
Status: DISCONTINUED | OUTPATIENT
Start: 2018-10-10 | End: 2018-10-11 | Stop reason: HOSPADM

## 2018-10-10 RX ORDER — CELECOXIB 200 MG/1
200 CAPSULE ORAL 2 TIMES DAILY WITH MEALS
Status: DISCONTINUED | OUTPATIENT
Start: 2018-10-10 | End: 2018-10-11 | Stop reason: HOSPADM

## 2018-10-10 RX ORDER — POLYETHYLENE GLYCOL 3350 17 G/17G
1 POWDER, FOR SOLUTION ORAL 2 TIMES DAILY
Status: DISCONTINUED | OUTPATIENT
Start: 2018-10-10 | End: 2018-10-11 | Stop reason: HOSPADM

## 2018-10-10 RX ORDER — HYDROMORPHONE HYDROCHLORIDE 2 MG/ML
.5-1 INJECTION, SOLUTION INTRAMUSCULAR; INTRAVENOUS; SUBCUTANEOUS
Status: DISCONTINUED | OUTPATIENT
Start: 2018-10-10 | End: 2018-10-10

## 2018-10-10 RX ORDER — DEXTROSE MONOHYDRATE 25 G/50ML
25 INJECTION, SOLUTION INTRAVENOUS
Status: DISCONTINUED | OUTPATIENT
Start: 2018-10-10 | End: 2018-10-11

## 2018-10-10 RX ORDER — AMOXICILLIN 250 MG
1 CAPSULE ORAL NIGHTLY
Status: DISCONTINUED | OUTPATIENT
Start: 2018-10-10 | End: 2018-10-11 | Stop reason: HOSPADM

## 2018-10-10 RX ORDER — IBUPROFEN 600 MG/1
600 TABLET ORAL ONCE
Status: DISCONTINUED | OUTPATIENT
Start: 2018-10-10 | End: 2018-10-10 | Stop reason: CLARIF

## 2018-10-10 RX ORDER — OXYCODONE HYDROCHLORIDE 5 MG/1
5-10 TABLET ORAL
Status: DISCONTINUED | OUTPATIENT
Start: 2018-10-10 | End: 2018-10-11 | Stop reason: HOSPADM

## 2018-10-10 RX ORDER — ACETAMINOPHEN 325 MG/1
650 TABLET ORAL ONCE
Status: DISCONTINUED | OUTPATIENT
Start: 2018-10-10 | End: 2018-10-10 | Stop reason: CLARIF

## 2018-10-10 RX ORDER — AMOXICILLIN 250 MG
1 CAPSULE ORAL
Status: DISCONTINUED | OUTPATIENT
Start: 2018-10-10 | End: 2018-10-11 | Stop reason: HOSPADM

## 2018-10-10 RX ADMIN — HYDROMORPHONE HYDROCHLORIDE 0.5 MG: 2 INJECTION INTRAMUSCULAR; INTRAVENOUS; SUBCUTANEOUS at 21:52

## 2018-10-10 RX ADMIN — SULFAMETHOXAZOLE AND TRIMETHOPRIM 1 TABLET: 800; 160 TABLET ORAL at 21:50

## 2018-10-10 RX ADMIN — DOCUSATE SODIUM 100 MG: 100 CAPSULE, LIQUID FILLED ORAL at 21:49

## 2018-10-10 RX ADMIN — FAMOTIDINE 20 MG: 20 TABLET ORAL at 21:50

## 2018-10-10 RX ADMIN — CELECOXIB 200 MG: 200 CAPSULE ORAL at 21:51

## 2018-10-10 RX ADMIN — STANDARDIZED SENNA CONCENTRATE AND DOCUSATE SODIUM 1 TABLET: 8.6; 5 TABLET, FILM COATED ORAL at 21:51

## 2018-10-10 RX ADMIN — GABAPENTIN 100 MG: 100 CAPSULE ORAL at 21:50

## 2018-10-10 RX ADMIN — ACETAMINOPHEN 1000 MG: 500 TABLET, FILM COATED ORAL at 21:51

## 2018-10-10 RX ADMIN — ENOXAPARIN SODIUM 30 MG: 100 INJECTION SUBCUTANEOUS at 21:48

## 2018-10-10 RX ADMIN — OXYCODONE HYDROCHLORIDE 5 MG: 5 TABLET ORAL at 20:30

## 2018-10-10 RX ADMIN — IOHEXOL 100 ML: 350 INJECTION, SOLUTION INTRAVENOUS at 18:44

## 2018-10-10 RX ADMIN — FENTANYL CITRATE 50 MCG: 50 INJECTION, SOLUTION INTRAMUSCULAR; INTRAVENOUS at 18:12

## 2018-10-10 ASSESSMENT — PATIENT HEALTH QUESTIONNAIRE - PHQ9
SUM OF ALL RESPONSES TO PHQ9 QUESTIONS 1 AND 2: 0
2. FEELING DOWN, DEPRESSED, IRRITABLE, OR HOPELESS: NOT AT ALL
1. LITTLE INTEREST OR PLEASURE IN DOING THINGS: NOT AT ALL

## 2018-10-10 ASSESSMENT — COPD QUESTIONNAIRES
COPD SCREENING SCORE: 4
DO YOU EVER COUGH UP ANY MUCUS OR PHLEGM?: NO/ONLY WITH OCCASIONAL COLDS OR INFECTIONS
DURING THE PAST 4 WEEKS HOW MUCH DID YOU FEEL SHORT OF BREATH: NONE/LITTLE OF THE TIME
HAVE YOU SMOKED AT LEAST 100 CIGARETTES IN YOUR ENTIRE LIFE: YES

## 2018-10-10 ASSESSMENT — LIFESTYLE VARIABLES
TOTAL SCORE: 0
ON A TYPICAL DAY WHEN YOU DRINK ALCOHOL HOW MANY DRINKS DO YOU HAVE: 1
HOW MANY TIMES IN THE PAST YEAR HAVE YOU HAD 5 OR MORE DRINKS IN A DAY: 0
HAVE YOU EVER FELT YOU SHOULD CUT DOWN ON YOUR DRINKING: NO
EVER_SMOKED: YES
ALCOHOL_USE: YES
TOTAL SCORE: 0
CONSUMPTION TOTAL: NEGATIVE
AVERAGE NUMBER OF DAYS PER WEEK YOU HAVE A DRINK CONTAINING ALCOHOL: 7
EVER FELT BAD OR GUILTY ABOUT YOUR DRINKING: NO
HAVE PEOPLE ANNOYED YOU BY CRITICIZING YOUR DRINKING: NO
EVER HAD A DRINK FIRST THING IN THE MORNING TO STEADY YOUR NERVES TO GET RID OF A HANGOVER: NO
TOTAL SCORE: 0
EVER_SMOKED: YES

## 2018-10-10 ASSESSMENT — COGNITIVE AND FUNCTIONAL STATUS - GENERAL
SUGGESTED CMS G CODE MODIFIER DAILY ACTIVITY: CH
MOBILITY SCORE: 24
SUGGESTED CMS G CODE MODIFIER MOBILITY: CH
DAILY ACTIVITIY SCORE: 24

## 2018-10-10 ASSESSMENT — PAIN SCALES - GENERAL
PAINLEVEL_OUTOF10: 4
PAINLEVEL_OUTOF10: 4
PAINLEVEL_OUTOF10: 8

## 2018-10-11 ENCOUNTER — APPOINTMENT (OUTPATIENT)
Dept: RADIOLOGY | Facility: MEDICAL CENTER | Age: 63
DRG: 565 | End: 2018-10-11
Attending: NURSE PRACTITIONER
Payer: COMMERCIAL

## 2018-10-11 VITALS
BODY MASS INDEX: 32.3 KG/M2 | TEMPERATURE: 98.2 F | OXYGEN SATURATION: 91 % | SYSTOLIC BLOOD PRESSURE: 120 MMHG | DIASTOLIC BLOOD PRESSURE: 76 MMHG | HEART RATE: 68 BPM | HEIGHT: 77 IN | RESPIRATION RATE: 18 BRPM | WEIGHT: 273.59 LBS

## 2018-10-11 LAB
ABO GROUP BLD: NORMAL
ALBUMIN SERPL BCP-MCNC: 4.2 G/DL (ref 3.2–4.9)
ALBUMIN/GLOB SERPL: 2.1 G/DL
ALP SERPL-CCNC: 81 U/L (ref 30–99)
ALT SERPL-CCNC: 130 U/L (ref 2–50)
ANION GAP SERPL CALC-SCNC: 10 MMOL/L (ref 0–11.9)
AST SERPL-CCNC: 122 U/L (ref 12–45)
BASOPHILS # BLD AUTO: 0.7 % (ref 0–1.8)
BASOPHILS # BLD: 0.05 K/UL (ref 0–0.12)
BILIRUB SERPL-MCNC: 1.3 MG/DL (ref 0.1–1.5)
BUN SERPL-MCNC: 19 MG/DL (ref 8–22)
CALCIUM SERPL-MCNC: 8.9 MG/DL (ref 8.5–10.5)
CHLORIDE SERPL-SCNC: 105 MMOL/L (ref 96–112)
CO2 SERPL-SCNC: 22 MMOL/L (ref 20–33)
CREAT SERPL-MCNC: 1.1 MG/DL (ref 0.5–1.4)
EOSINOPHIL # BLD AUTO: 0.16 K/UL (ref 0–0.51)
EOSINOPHIL NFR BLD: 2.4 % (ref 0–6.9)
ERYTHROCYTE [DISTWIDTH] IN BLOOD BY AUTOMATED COUNT: 37.6 FL (ref 35.9–50)
GLOBULIN SER CALC-MCNC: 2 G/DL (ref 1.9–3.5)
GLUCOSE BLD-MCNC: 89 MG/DL (ref 65–99)
GLUCOSE SERPL-MCNC: 103 MG/DL (ref 65–99)
HCT VFR BLD AUTO: 40.2 % (ref 42–52)
HGB BLD-MCNC: 14.3 G/DL (ref 14–18)
IMM GRANULOCYTES # BLD AUTO: 0.02 K/UL (ref 0–0.11)
IMM GRANULOCYTES NFR BLD AUTO: 0.3 % (ref 0–0.9)
LYMPHOCYTES # BLD AUTO: 1.38 K/UL (ref 1–4.8)
LYMPHOCYTES NFR BLD: 20.7 % (ref 22–41)
MAGNESIUM SERPL-MCNC: 2.1 MG/DL (ref 1.5–2.5)
MCH RBC QN AUTO: 30.2 PG (ref 27–33)
MCHC RBC AUTO-ENTMCNC: 35.6 G/DL (ref 33.7–35.3)
MCV RBC AUTO: 84.8 FL (ref 81.4–97.8)
MONOCYTES # BLD AUTO: 0.52 K/UL (ref 0–0.85)
MONOCYTES NFR BLD AUTO: 7.8 % (ref 0–13.4)
NEUTROPHILS # BLD AUTO: 4.54 K/UL (ref 1.82–7.42)
NEUTROPHILS NFR BLD: 68.1 % (ref 44–72)
NRBC # BLD AUTO: 0 K/UL
NRBC BLD-RTO: 0 /100 WBC
PHOSPHATE SERPL-MCNC: 3.4 MG/DL (ref 2.5–4.5)
PLATELET # BLD AUTO: 200 K/UL (ref 164–446)
PMV BLD AUTO: 9.3 FL (ref 9–12.9)
POTASSIUM SERPL-SCNC: 4.1 MMOL/L (ref 3.6–5.5)
PROT SERPL-MCNC: 6.2 G/DL (ref 6–8.2)
RBC # BLD AUTO: 4.74 M/UL (ref 4.7–6.1)
RH BLD: NORMAL
SODIUM SERPL-SCNC: 137 MMOL/L (ref 135–145)
WBC # BLD AUTO: 6.7 K/UL (ref 4.8–10.8)

## 2018-10-11 PROCEDURE — 84100 ASSAY OF PHOSPHORUS: CPT

## 2018-10-11 PROCEDURE — 700102 HCHG RX REV CODE 250 W/ 637 OVERRIDE(OP): Performed by: NURSE PRACTITIONER

## 2018-10-11 PROCEDURE — 700112 HCHG RX REV CODE 229: Performed by: NURSE PRACTITIONER

## 2018-10-11 PROCEDURE — 94660 CPAP INITIATION&MGMT: CPT

## 2018-10-11 PROCEDURE — 80053 COMPREHEN METABOLIC PANEL: CPT

## 2018-10-11 PROCEDURE — 85025 COMPLETE CBC W/AUTO DIFF WBC: CPT

## 2018-10-11 PROCEDURE — 83735 ASSAY OF MAGNESIUM: CPT

## 2018-10-11 PROCEDURE — A9270 NON-COVERED ITEM OR SERVICE: HCPCS | Performed by: NURSE PRACTITIONER

## 2018-10-11 PROCEDURE — 36415 COLL VENOUS BLD VENIPUNCTURE: CPT

## 2018-10-11 PROCEDURE — 71045 X-RAY EXAM CHEST 1 VIEW: CPT

## 2018-10-11 PROCEDURE — 82962 GLUCOSE BLOOD TEST: CPT

## 2018-10-11 PROCEDURE — 700111 HCHG RX REV CODE 636 W/ 250 OVERRIDE (IP): Performed by: NURSE PRACTITIONER

## 2018-10-11 RX ORDER — ACETAMINOPHEN 500 MG
1000 TABLET ORAL EVERY 6 HOURS
Status: DISCONTINUED | OUTPATIENT
Start: 2018-10-11 | End: 2018-10-11 | Stop reason: HOSPADM

## 2018-10-11 RX ORDER — OXYCODONE HYDROCHLORIDE 5 MG/1
5-10 TABLET ORAL
Qty: 30 TAB | Refills: 0 | Status: SHIPPED | OUTPATIENT
Start: 2018-10-11 | End: 2018-10-18

## 2018-10-11 RX ADMIN — GABAPENTIN 100 MG: 100 CAPSULE ORAL at 06:09

## 2018-10-11 RX ADMIN — ENOXAPARIN SODIUM 30 MG: 100 INJECTION SUBCUTANEOUS at 06:10

## 2018-10-11 RX ADMIN — SULFAMETHOXAZOLE AND TRIMETHOPRIM 1 TABLET: 800; 160 TABLET ORAL at 08:21

## 2018-10-11 RX ADMIN — FAMOTIDINE 20 MG: 20 TABLET ORAL at 06:10

## 2018-10-11 RX ADMIN — DOCUSATE SODIUM 100 MG: 100 CAPSULE, LIQUID FILLED ORAL at 06:10

## 2018-10-11 RX ADMIN — CELECOXIB 200 MG: 200 CAPSULE ORAL at 06:10

## 2018-10-11 RX ADMIN — ACETAMINOPHEN 1000 MG: 500 TABLET, FILM COATED ORAL at 06:10

## 2018-10-11 ASSESSMENT — PAIN SCALES - GENERAL
PAINLEVEL_OUTOF10: 2
PAINLEVEL_OUTOF10: 5
PAINLEVEL_OUTOF10: 4
PAINLEVEL_OUTOF10: 5

## 2018-10-11 ASSESSMENT — ENCOUNTER SYMPTOMS
MYALGIAS: 1
CONSTITUTIONAL NEGATIVE: 1
RESPIRATORY NEGATIVE: 1
NEUROLOGICAL NEGATIVE: 1

## 2018-10-11 NOTE — PROGRESS NOTES
Patient arrived to room.  Tele-monitor placed on patient,  7/10 medicated per MAR    Two RN skin check:    Bruising evident on torso,   RLL staph infection patient has been taking antibiotics for  And dressing daily, Ace bandage  Red abrasions bilaterally LE    Skin is intact over all bony prominences

## 2018-10-11 NOTE — ED NOTES
Med rec updated and complete.  Allergies reviewed.  Met with pt at bedside and dicussed  Current medications and last doses taken.  Pt is currently on an antibiotic Bactrim.  Start date 10/03/18. Pt reports it is for an infection  In his right leg.
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
## 2018-10-11 NOTE — ASSESSMENT & PLAN NOTE
Right lower extremity rash with discharge. Seen outpatient on 10/3 and started Bactrim  Bactrim x 3 days ordered to complete course.  Dressing changes. Wound care.

## 2018-10-11 NOTE — CARE PLAN
Problem: Safety  Goal: Will remain free from injury  Outcome: PROGRESSING AS EXPECTED  Patient encouraged to dangle at bedside prior to getting out of bed.    Problem: Pain Management  Goal: Pain level will decrease to patient's comfort goal  Outcome: PROGRESSING AS EXPECTED  Will medicate per MAR prn

## 2018-10-11 NOTE — CARE PLAN
Problem: Safety  Goal: Will remain free from injury  Outcome: PROGRESSING AS EXPECTED  Patient oriented to room, bathroom, and how to use the call light.  Fall precautions are in place    Problem: Pain Management  Goal: Pain level will decrease to patient's comfort goal  Outcome: PROGRESSING AS EXPECTED  Patient's pain is well controlled with medication per MAR

## 2018-10-11 NOTE — PROGRESS NOTES
Discharging Patient home per physician order.  Discharged with spouse.  Demonstrated understanding of discharge instructions, follow up appointments, home medications, prescriptions, home care for surgical wound, and nursing care instructions for sternal fracture.  Ambulating without assistance, voiding without difficulty, pain well controlled, tolerating oral medications, oxygen saturation greater than 90% , tolerating diet.   Educational handouts given and discussed.  Verbalized understanding of discharge instructions and educational handouts.  All questions answered.  Belongings with patient at time of discharge.
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
## 2018-10-11 NOTE — ASSESSMENT & PLAN NOTE
(+) Seat belt sign   Abnormal soft tissue density in the retrosternal fat compatible with contusion and/or hematoma.  Aggressive multimodal pain management, and pulmonary hygiene. Serial chest radiographs.

## 2018-10-11 NOTE — DISCHARGE INSTRUCTIONS
Discharge Instructions    Discharged to home by car with relative. Discharged via walking, hospital escort: Refused.  Special equipment needed: Not Applicable    Be sure to schedule a follow-up appointment with your primary care doctor or any specialists as instructed.     Discharge Plan:   Influenza Vaccine Indication: Patient Refuses    I understand that a diet low in cholesterol, fat, and sodium is recommended for good health. Unless I have been given specific instructions below for another diet, I accept this instruction as my diet prescription.   Other diet: regular    Special Instructions: None    · Is patient discharged on Warfarin / Coumadin?   No     Depression / Suicide Risk    As you are discharged from this [facility] facility, it is important to learn how to keep safe from harming yourself.    Recognize the warning signs:  · Abrupt changes in personality, positive or negative- including increase in energy   · Giving away possessions  · Change in eating patterns- significant weight changes-  positive or negative  · Change in sleeping patterns- unable to sleep or sleeping all the time   · Unwillingness or inability to communicate  · Depression  · Unusual sadness, discouragement and loneliness  · Talk of wanting to die  · Neglect of personal appearance   · Rebelliousness- reckless behavior  · Withdrawal from people/activities they love  · Confusion- inability to concentrate     If you or a loved one observes any of these behaviors or has concerns about self-harm, here's what you can do:  · Talk about it- your feelings and reasons for harming yourself  · Remove any means that you might use to hurt yourself (examples: pills, rope, extension cords, firearm)  · Get professional help from the community (Mental Health, Substance Abuse, psychological counseling)  · Do not be alone:Call your Safe Contact- someone whom you trust who will be there for you.  · Call your local CRISIS HOTLINE 177-9789 or  943.320.6196  · Call your local Children's Mobile Crisis Response Team [place] (662) 954-3682 or www.[employer]  · Call the toll free National Suicide Prevention Hotlines   · National Suicide Prevention Lifeline 891-124-QZKE (0277)  · National Hope Line Network 800-SUICIDE (308-3661)    Call or seek medical attention for questions or concerns   - Follow up with Dr. Alexander in 1-2 weeks time   - Follow up with primary care provider within one weeks time   - Resume regular diet   - May take over the counter acetaminophen or ibuprofen as needed for pain   - Continue daily over the counter stool softener while on narcotics   - No operation of machinery or motorized vehicles while under the influence of narcotics   - No alcohol use while under the influence of narcotics   - No swimming, hot tubs, baths or wound submersion until cleared by outpatient provider. May shower   - No contact sports, strenuous activities, or heavy lifting until cleared by outpatient provider   - If respiratory decompensation, change in condition or worsening conditoin, or signs or symptoms of infection occur seek medical attention    Sternal Fracture  A fractured breastbone (sternum) can happen from a blow to the front of the chest. Sternal fractures usually take 6 to 12 weeks to heal. Depending upon the cause of the injury, some sternal fractures are also associated with injuries to the heart, lungs, ribs and abdomen. Hospital care may be needed to evaluate or treat other internal injuries.  Treatment for a sternal fracture includes bed rest for 2 to 3 days, and then activity should be gradually increased as tolerated. Ice packs placed over the injury for 20 to 30 minutes every 3 to 4 hours for the first 2 to 3 days will help reduce pain and swelling. Deep breathing or coughing every hour can help keep the lungs clear. Pain or anti-inflammatory medicines may be prescribed to reduce the discomfort.  This is a potentially dangerous  condition. Internal bruising of the heart, lungs or abdominal organs can cause life-threatening complications.  SEEK IMMEDIATE MEDICAL CARE IF:   · You or your child develops shortness of breath or begins coughing up blood.   · The pain spreads into the arms, neck, back or belly.   · You or your child develops abdominal pain, vomiting, severe weakness or fainting.   · You or your child has an oral temperature above 102° F (38.9° C), not controlled by medicine.   · You or your child develops an irregular heartbeat or palpitations.   · There are any other concerns related to the injury.   · There is increasing pain that is unresponsive to the prescribed medications.   MAKE SURE YOU:   · Understand these instructions.   · Will watch your condition.   · Will get help right away if you are not doing well or get worse.   Document Released: 01/25/2006 Document Revised: 03/11/2013 Document Reviewed: 01/05/2011  [employer]® Patient Information ©2013 [employer], [employer].

## 2018-10-11 NOTE — PROGRESS NOTES
Assumed care of patient from night shift RN.  Patient is alert and oriented times 4, states pain of 2/10, declines intervention at this time.  VSS /70   Pulse 77   Temp 36.7 °C (98 °F)   Resp 18   Wt 124.1 kg (273 lb 9.5 oz)   SpO2 92%   BMI 32.44 kg/m²   PIV in the RAC, patent and saline locked.  On RA with saturations in the mid 90s, CPAP used at night.  On tele monitoring.  Last BM 10/10, urinating without difficulty.  Full liquid diet, tolerating well.  Bruising noted to the chest and abdomen.  Dressing to the R foot/ankle from current staph infection being treated outpatient.  Q48 hour dressing changes.  Patient is up self, demonstrates steady gait, no assistance needed.  POC discussed for the day, bed is locked and in the lowest position, call light is within reach.  All needs are met at this time, hourly rounding is in place.

## 2018-10-11 NOTE — ASSESSMENT & PLAN NOTE
Acute slightly displaced midsternal fracture  Telemetry monitor  Aggressive multimodal pain management, and pulmonary hygiene. Serial chest radiographs.
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
## 2018-10-11 NOTE — ED NOTES
Called report to Danette GOMEZ. Pt is aware of POC. Pt belongings are on bed. Pt is ready for transport.

## 2018-10-29 ENCOUNTER — HOSPITAL ENCOUNTER (OUTPATIENT)
Dept: RADIOLOGY | Facility: MEDICAL CENTER | Age: 63
End: 2018-10-29
Attending: NURSE PRACTITIONER
Payer: COMMERCIAL

## 2018-10-29 DIAGNOSIS — S22.20XA CLOSED FRACTURE OF STERNUM, UNSPECIFIED PORTION OF STERNUM, INITIAL ENCOUNTER: ICD-10-CM

## 2018-10-29 PROCEDURE — 71046 X-RAY EXAM CHEST 2 VIEWS: CPT

## 2018-11-07 ENCOUNTER — OFFICE VISIT (OUTPATIENT)
Dept: URGENT CARE | Facility: CLINIC | Age: 63
End: 2018-11-07
Payer: COMMERCIAL

## 2018-11-07 VITALS
BODY MASS INDEX: 31.83 KG/M2 | TEMPERATURE: 97.8 F | WEIGHT: 269.6 LBS | OXYGEN SATURATION: 95 % | RESPIRATION RATE: 16 BRPM | DIASTOLIC BLOOD PRESSURE: 80 MMHG | HEART RATE: 82 BPM | HEIGHT: 77 IN | SYSTOLIC BLOOD PRESSURE: 122 MMHG

## 2018-11-07 DIAGNOSIS — L50.9 URTICARIAL RASH: ICD-10-CM

## 2018-11-07 PROCEDURE — 99213 OFFICE O/P EST LOW 20 MIN: CPT | Performed by: PHYSICIAN ASSISTANT

## 2018-11-07 RX ORDER — METHYLPREDNISOLONE SODIUM SUCCINATE 125 MG/2ML
125 INJECTION, POWDER, LYOPHILIZED, FOR SOLUTION INTRAMUSCULAR; INTRAVENOUS ONCE
Status: COMPLETED | OUTPATIENT
Start: 2018-11-07 | End: 2018-11-07

## 2018-11-07 RX ORDER — CLOTRIMAZOLE AND BETAMETHASONE DIPROPIONATE 10; .64 MG/G; MG/G
CREAM TOPICAL
Qty: 30 G | Refills: 1 | Status: SHIPPED | OUTPATIENT
Start: 2018-11-07

## 2018-11-07 RX ORDER — METHYLPREDNISOLONE 4 MG/1
TABLET ORAL
Qty: 1 TAB | Refills: 0 | Status: SHIPPED | OUTPATIENT
Start: 2018-11-07

## 2018-11-07 RX ADMIN — METHYLPREDNISOLONE SODIUM SUCCINATE 125 MG: 125 INJECTION, POWDER, LYOPHILIZED, FOR SOLUTION INTRAMUSCULAR; INTRAVENOUS at 13:49

## 2018-11-07 ASSESSMENT — ENCOUNTER SYMPTOMS
MUSCULOSKELETAL NEGATIVE: 1
NEUROLOGICAL NEGATIVE: 1
FEVER: 0
CONSTITUTIONAL NEGATIVE: 1

## 2018-11-07 NOTE — PROGRESS NOTES
"Subjective:      Benjamin John is a 63 y.o. male who presents with Rash (x over 1 month and getting worse, very itchy rash on both legs, back and ankles)            Rash   This is a chronic (itchy rash back of legs, back) problem. The current episode started more than 1 month ago. The rash is diffuse. The rash is characterized by redness and itchiness. He was exposed to nothing. Pertinent negatives include no facial edema, fever or joint pain. Past treatments include nothing. The treatment provided no relief. There is no history of allergies or eczema.       Review of Systems   Constitutional: Negative.  Negative for fever.   Musculoskeletal: Negative.  Negative for joint pain.   Skin: Positive for rash.   Neurological: Negative.           Objective:     /80 (BP Location: Left arm, Patient Position: Sitting, BP Cuff Size: Large adult)   Pulse 82   Temp 36.6 °C (97.8 °F) (Temporal)   Resp 16   Ht 1.956 m (6' 5\")   Wt 122.3 kg (269 lb 9.6 oz)   SpO2 95%   BMI 31.97 kg/m²      Physical Exam   Constitutional: He is oriented to person, place, and time. He appears well-developed and well-nourished. No distress.   Musculoskeletal: Normal range of motion. He exhibits no edema or tenderness.   Neurological: He is alert and oriented to person, place, and time. No sensory deficit. He exhibits normal muscle tone. Coordination normal.   Skin: Skin is warm and dry. Capillary refill takes less than 2 seconds. Rash (excori rash leg folds; mac patch ankle, back, 'palm' sz ~) noted.   Psychiatric: He has a normal mood and affect. His behavior is normal.   Nursing note and vitals reviewed.    Active Ambulatory Problems     Diagnosis Date Noted   • Atrial flutter (HCC) 11/18/2016   • Sleep apnea 04/26/2017   • Sternal fracture 10/10/2018   • Chest wall contusion 10/10/2018   • Trauma 10/10/2018   • Cellulitis 10/10/2018     Resolved Ambulatory Problems     Diagnosis Date Noted   • Atrial flutter with rapid " ventricular response (HCC) 11/12/2016     Past Medical History:   Diagnosis Date   • Atrial fibrillation ([facility])    • Back pain    • Nasal drainage    • Sepsis ([facility]) 2009   • Tonsillitis      Current Outpatient Prescriptions on File Prior to Visit   Medication Sig Dispense Refill   • atorvastatin (LIPITOR) 20 MG Tab Take 20 mg by mouth every evening.     • vitamin D (CHOLECALCIFEROL) 1000 UNIT Tab Take 1,000 Units by mouth every day.     • Garlic 1000 MG Cap Take 1 Cap by mouth every bedtime.     • Melatonin 10 MG Tab Take 10 mg by mouth every bedtime.     • coenzyme Q-10 100 MG Cap capsule Take 200 mg by mouth every bedtime.     • aspirin (ASPIRIN ADULT LOW DOSE) 81 MG EC tablet Take 81 mg by mouth every day.     • Multiple Vitamins-Minerals (CENTRUM SILVER ULTRA MENS) Tab Take 1 tablet by mouth every day.     • Omega-3 Fatty Acids (FISH OIL PO) Take 1 Cap by mouth every day.     • Probiotic Cap Take 2 Caps by mouth every day.       No current facility-administered medications on file prior to visit.      Social History     Social History   • Marital status:      Spouse name: N/A   • Number of children: N/A   • Years of education: N/A     Occupational History   • Not on file.     Social History Main Topics   • Smoking status: Former Smoker     Quit date: 1/1/1977   • Smokeless tobacco: Never Used   • Alcohol use Yes      Comment: occ   • Drug use: No   • Sexual activity: Not on file     Other Topics Concern   • Not on file     Social History Narrative   • No narrative on file     Family History   Problem Relation Age of Onset   • Heart Attack Father    • Sleep Apnea Neg Hx      Codeine              Assessment/Plan:     ·  urt rash      · rx meds; otc prn
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
## 2019-04-08 ENCOUNTER — HOSPITAL ENCOUNTER (OUTPATIENT)
Dept: RADIOLOGY | Facility: MEDICAL CENTER | Age: 64
End: 2019-04-08
Attending: CHIROPRACTOR
Payer: COMMERCIAL

## 2019-04-08 DIAGNOSIS — M54.5 LOW BACK PAIN, UNSPECIFIED BACK PAIN LATERALITY, UNSPECIFIED CHRONICITY, WITH SCIATICA PRESENCE UNSPECIFIED: ICD-10-CM

## 2019-04-08 DIAGNOSIS — M99.03 SOMATIC DYSFUNCTION OF LUMBAR REGION: ICD-10-CM

## 2019-04-08 PROCEDURE — 72100 X-RAY EXAM L-S SPINE 2/3 VWS: CPT

## 2020-05-16 ENCOUNTER — OFFICE VISIT (OUTPATIENT)
Dept: URGENT CARE | Facility: CLINIC | Age: 65
End: 2020-05-16
Payer: MEDICARE

## 2020-05-16 VITALS
HEART RATE: 75 BPM | OXYGEN SATURATION: 95 % | RESPIRATION RATE: 14 BRPM | HEIGHT: 77 IN | WEIGHT: 285 LBS | TEMPERATURE: 97.6 F | DIASTOLIC BLOOD PRESSURE: 68 MMHG | BODY MASS INDEX: 33.65 KG/M2 | SYSTOLIC BLOOD PRESSURE: 126 MMHG

## 2020-05-16 DIAGNOSIS — L25.9 CONTACT DERMATITIS, UNSPECIFIED CONTACT DERMATITIS TYPE, UNSPECIFIED TRIGGER: Primary | ICD-10-CM

## 2020-05-16 PROCEDURE — 99214 OFFICE O/P EST MOD 30 MIN: CPT | Performed by: FAMILY MEDICINE

## 2020-05-16 RX ORDER — TRIAMCINOLONE ACETONIDE 1 MG/G
2 OINTMENT TOPICAL 2 TIMES DAILY
Qty: 1 TUBE | Refills: 0 | Status: SHIPPED | OUTPATIENT
Start: 2020-05-16

## 2020-05-16 RX ORDER — DEXAMETHASONE SODIUM PHOSPHATE 10 MG/ML
10 INJECTION INTRAMUSCULAR; INTRAVENOUS ONCE
Status: DISCONTINUED | OUTPATIENT
Start: 2020-05-16 | End: 2020-05-16

## 2020-05-16 RX ORDER — DEXAMETHASONE SODIUM PHOSPHATE 4 MG/ML
10 INJECTION, SOLUTION INTRA-ARTICULAR; INTRALESIONAL; INTRAMUSCULAR; INTRAVENOUS; SOFT TISSUE ONCE
Status: COMPLETED | OUTPATIENT
Start: 2020-05-16 | End: 2020-05-16

## 2020-05-16 RX ADMIN — DEXAMETHASONE SODIUM PHOSPHATE 10 MG: 4 INJECTION, SOLUTION INTRA-ARTICULAR; INTRALESIONAL; INTRAMUSCULAR; INTRAVENOUS; SOFT TISSUE at 15:20

## 2020-05-16 ASSESSMENT — FIBROSIS 4 INDEX: FIB4 SCORE: 3.48

## 2020-05-16 ASSESSMENT — ENCOUNTER SYMPTOMS
VOMITING: 0
RHINORRHEA: 0
SORE THROAT: 0
FEVER: 0
NAIL CHANGES: 0
EYE PAIN: 0
FATIGUE: 0
COUGH: 0
ANOREXIA: 0
SHORTNESS OF BREATH: 0
DIARRHEA: 0

## 2020-05-16 NOTE — PROGRESS NOTES
"Subjective:      Benjamin John is a 64 y.o. male who presents with Poison Ivy (on hands for x2 wks)            Rash   This is a new problem. The current episode started in the past 7 days. The problem is unchanged. Location: left wrsit  The rash is characterized by dryness and itchiness. It is unknown if there was an exposure to a precipitant. Pertinent negatives include no anorexia, congestion, cough, diarrhea, eye pain, facial edema, fatigue, fever, joint pain, nail changes, rhinorrhea, shortness of breath, sore throat or vomiting. Past treatments include nothing. The treatment provided no relief. There is no history of allergies, asthma, eczema or varicella.       Review of Systems   Constitutional: Negative for fatigue and fever.   HENT: Negative for congestion, rhinorrhea and sore throat.    Eyes: Negative for pain.   Respiratory: Negative for cough and shortness of breath.    Gastrointestinal: Negative for anorexia, diarrhea and vomiting.   Musculoskeletal: Negative for joint pain.   Skin: Positive for rash. Negative for nail changes.          Objective:     /68 (BP Location: Left arm, Patient Position: Sitting, BP Cuff Size: Large adult)   Pulse 75   Temp 36.4 °C (97.6 °F)   Resp 14   Ht 1.956 m (6' 5\")   Wt (!) 129.3 kg (285 lb)   SpO2 95%   BMI 33.80 kg/m²      Physical Exam  Vitals signs reviewed.   Constitutional:       General: He is not in acute distress.     Appearance: Normal appearance. He is not ill-appearing, toxic-appearing or diaphoretic.   HENT:      Head: Normocephalic and atraumatic.      Nose: Nose normal.      Mouth/Throat:      Mouth: Mucous membranes are moist.   Neck:      Musculoskeletal: Normal range of motion.   Cardiovascular:      Rate and Rhythm: Normal rate.      Pulses: Normal pulses.   Pulmonary:      Effort: Pulmonary effort is normal.   Abdominal:      General: Abdomen is flat.   Musculoskeletal: Normal range of motion.   Skin:     General: Skin is warm and " dry.          Neurological:      Mental Status: He is alert.                 Assessment/Plan:       1. Contact dermatitis, unspecified contact dermatitis type, unspecified trigger  1. Contact dermatitis, unspecified contact dermatitis type, unspecified trigger  dexamethasone (DECADRON) injection (check route below) 10 mg    triamcinolone acetonide (KENALOG) 0.1 % Ointment       - dexamethasone (DECADRON) injection (check route below) 10 mg  - triamcinolone acetonide (KENALOG) 0.1 % Ointment; Apply 2 g to affected area(s) 2 times a day.  Dispense: 1 Tube; Refill: 0    Patient does not within normal limits, physical exam is consistent with contact dermatitis, patient stated he  his dog after going through the bushes and after that he started having the rash,  Give the patient Decadron, and use a steroid cream on the affected area,  Might also use Claritin as needed over-the-counter  Other red flag signs were reviewed with the patient at any time if the symptoms worsen, come back to the clinic

## 2021-03-03 DIAGNOSIS — Z23 NEED FOR VACCINATION: ICD-10-CM

## 2022-05-03 ENCOUNTER — APPOINTMENT (RX ONLY)
Dept: URBAN - NONMETROPOLITAN AREA CLINIC 9 | Facility: CLINIC | Age: 67
Setting detail: DERMATOLOGY
End: 2022-05-03

## 2022-05-03 DIAGNOSIS — L82.1 OTHER SEBORRHEIC KERATOSIS: ICD-10-CM

## 2022-05-03 DIAGNOSIS — D22 MELANOCYTIC NEVI: ICD-10-CM

## 2022-05-03 DIAGNOSIS — L57.8 OTHER SKIN CHANGES DUE TO CHRONIC EXPOSURE TO NONIONIZING RADIATION: ICD-10-CM

## 2022-05-03 DIAGNOSIS — Z12.83 ENCOUNTER FOR SCREENING FOR MALIGNANT NEOPLASM OF SKIN: ICD-10-CM

## 2022-05-03 PROBLEM — D22.5 MELANOCYTIC NEVI OF TRUNK: Status: ACTIVE | Noted: 2022-05-03

## 2022-05-03 PROBLEM — D22.61 MELANOCYTIC NEVI OF RIGHT UPPER LIMB, INCLUDING SHOULDER: Status: ACTIVE | Noted: 2022-05-03

## 2022-05-03 PROCEDURE — 99203 OFFICE O/P NEW LOW 30 MIN: CPT

## 2022-05-03 PROCEDURE — ? COUNSELING

## 2022-05-03 ASSESSMENT — LOCATION DETAILED DESCRIPTION DERM
LOCATION DETAILED: RIGHT ANTERIOR SHOULDER
LOCATION DETAILED: INFERIOR LUMBAR SPINE
LOCATION DETAILED: LEFT INFERIOR CENTRAL MALAR CHEEK
LOCATION DETAILED: EPIGASTRIC SKIN
LOCATION DETAILED: STERNUM
LOCATION DETAILED: LEFT SUPERIOR UPPER BACK
LOCATION DETAILED: RIGHT CLAVICULAR SKIN
LOCATION DETAILED: RIGHT CLAVICULAR NECK
LOCATION DETAILED: RIGHT VENTRAL DISTAL FOREARM
LOCATION DETAILED: RIGHT MID-UPPER BACK
LOCATION DETAILED: LEFT MEDIAL INFERIOR CHEST
LOCATION DETAILED: RIGHT INFERIOR LATERAL LOWER BACK
LOCATION DETAILED: PERIUMBILICAL SKIN
LOCATION DETAILED: RIGHT ANTERIOR DISTAL UPPER ARM

## 2022-05-03 ASSESSMENT — LOCATION ZONE DERM
LOCATION ZONE: NECK
LOCATION ZONE: FACE
LOCATION ZONE: TRUNK
LOCATION ZONE: TRUNK
LOCATION ZONE: ARM

## 2022-05-03 ASSESSMENT — LOCATION SIMPLE DESCRIPTION DERM
LOCATION SIMPLE: RIGHT UPPER ARM
LOCATION SIMPLE: RIGHT UPPER BACK
LOCATION SIMPLE: RIGHT CLAVICULAR SKIN
LOCATION SIMPLE: RIGHT SHOULDER
LOCATION SIMPLE: RIGHT FOREARM
LOCATION SIMPLE: RIGHT LOWER BACK
LOCATION SIMPLE: LEFT UPPER BACK
LOCATION SIMPLE: LOWER BACK
LOCATION SIMPLE: CHEST
LOCATION SIMPLE: ABDOMEN
LOCATION SIMPLE: ABDOMEN
LOCATION SIMPLE: LEFT CHEEK
LOCATION SIMPLE: RIGHT ANTERIOR NECK

## 2022-05-03 NOTE — PROCEDURE: MIPS QUALITY
Quality 431: Preventive Care And Screening: Unhealthy Alcohol Use - Screening: Patient not identified as an unhealthy alcohol user when screened for unhealthy alcohol use using a systematic screening method
Quality 47: Advance Care Plan: Advance care planning not documented, reason not otherwise specified.
Quality 110: Preventive Care And Screening: Influenza Immunization: Influenza Immunization previously received during influenza season
Quality 226: Preventive Care And Screening: Tobacco Use: Screening And Cessation Intervention: Patient screened for tobacco use and is an ex/non-smoker
Detail Level: Detailed
Quality 111:Pneumonia Vaccination Status For Older Adults: Pneumococcal Vaccination Previously Received
Quality 130: Documentation Of Current Medications In The Medical Record: Current Medications Documented

## 2023-05-04 ENCOUNTER — APPOINTMENT (RX ONLY)
Dept: RURAL CLINIC 1 | Facility: CLINIC | Age: 68
Setting detail: DERMATOLOGY
End: 2023-05-04

## 2023-05-04 DIAGNOSIS — Z12.83 ENCOUNTER FOR SCREENING FOR MALIGNANT NEOPLASM OF SKIN: ICD-10-CM

## 2023-05-04 DIAGNOSIS — L57.8 OTHER SKIN CHANGES DUE TO CHRONIC EXPOSURE TO NONIONIZING RADIATION: ICD-10-CM

## 2023-05-04 DIAGNOSIS — D22 MELANOCYTIC NEVI: ICD-10-CM

## 2023-05-04 DIAGNOSIS — L23.0 ALLERGIC CONTACT DERMATITIS DUE TO METALS: ICD-10-CM

## 2023-05-04 DIAGNOSIS — D18.0 HEMANGIOMA: ICD-10-CM

## 2023-05-04 DIAGNOSIS — L82.1 OTHER SEBORRHEIC KERATOSIS: ICD-10-CM

## 2023-05-04 PROBLEM — D22.71 MELANOCYTIC NEVI OF RIGHT LOWER LIMB, INCLUDING HIP: Status: ACTIVE | Noted: 2023-05-04

## 2023-05-04 PROBLEM — D22.72 MELANOCYTIC NEVI OF LEFT LOWER LIMB, INCLUDING HIP: Status: ACTIVE | Noted: 2023-05-04

## 2023-05-04 PROBLEM — D22.5 MELANOCYTIC NEVI OF TRUNK: Status: ACTIVE | Noted: 2023-05-04

## 2023-05-04 PROBLEM — D18.01 HEMANGIOMA OF SKIN AND SUBCUTANEOUS TISSUE: Status: ACTIVE | Noted: 2023-05-04

## 2023-05-04 PROCEDURE — 99213 OFFICE O/P EST LOW 20 MIN: CPT

## 2023-05-04 PROCEDURE — ? MEDICATION COUNSELING

## 2023-05-04 PROCEDURE — ? PRESCRIPTION

## 2023-05-04 PROCEDURE — ? COUNSELING

## 2023-05-04 PROCEDURE — ? TREATMENT REGIMEN

## 2023-05-04 RX ORDER — CLOBETASOL PROPIONATE 0.5 MG/G
CREAM TOPICAL BID
Qty: 60 | Refills: 3 | Status: ERX | COMMUNITY
Start: 2023-05-04

## 2023-05-04 RX ADMIN — CLOBETASOL PROPIONATE: 0.5 CREAM TOPICAL at 00:00

## 2023-05-04 ASSESSMENT — LOCATION SIMPLE DESCRIPTION DERM
LOCATION SIMPLE: RIGHT KNEE
LOCATION SIMPLE: RIGHT PRETIBIAL REGION
LOCATION SIMPLE: ABDOMEN
LOCATION SIMPLE: CHEST
LOCATION SIMPLE: LEFT THIGH
LOCATION SIMPLE: RIGHT UPPER BACK
LOCATION SIMPLE: LEFT CLAVICULAR SKIN
LOCATION SIMPLE: LEFT UPPER BACK
LOCATION SIMPLE: LEFT LOWER BACK
LOCATION SIMPLE: RIGHT LOWER BACK
LOCATION SIMPLE: LEFT FOREARM

## 2023-05-04 ASSESSMENT — LOCATION ZONE DERM
LOCATION ZONE: LEG
LOCATION ZONE: ARM
LOCATION ZONE: TRUNK

## 2023-05-04 ASSESSMENT — LOCATION DETAILED DESCRIPTION DERM
LOCATION DETAILED: LEFT CLAVICULAR SKIN
LOCATION DETAILED: EPIGASTRIC SKIN
LOCATION DETAILED: LEFT INFERIOR MEDIAL LOWER BACK
LOCATION DETAILED: RIGHT LATERAL UPPER BACK
LOCATION DETAILED: LEFT ANTERIOR PROXIMAL THIGH
LOCATION DETAILED: RIGHT LATERAL SUPERIOR CHEST
LOCATION DETAILED: RIGHT INFERIOR LATERAL LOWER BACK
LOCATION DETAILED: RIGHT PROXIMAL PRETIBIAL REGION
LOCATION DETAILED: LEFT MEDIAL INFERIOR CHEST
LOCATION DETAILED: PERIUMBILICAL SKIN
LOCATION DETAILED: RIGHT KNEE
LOCATION DETAILED: LEFT INFERIOR UPPER BACK
LOCATION DETAILED: LEFT DISTAL DORSAL FOREARM

## 2023-05-04 NOTE — PROCEDURE: MEDICATION COUNSELING
Fluconazole Counseling:  Patient counseled regarding adverse effects of fluconazole including but not limited to headache, diarrhea, nausea, upset stomach, liver function test abnormalities, taste disturbance, and stomach pain.  There is a rare possibility of liver failure that can occur when taking fluconazole.  The patient understands that monitoring of LFTs and kidney function test may be required, especially at baseline. The patient verbalized understanding of the proper use and possible adverse effects of fluconazole.  All of the patient's questions and concerns were addressed.
Opzelura Pregnancy And Lactation Text: There is insufficient data to evaluate drug-associated risk for major birth defects, miscarriage, or other adverse maternal or fetal outcomes.  There is a pregnancy registry that monitors pregnancy outcomes in pregnant persons exposed to the medication during pregnancy.  It is unknown if this medication is excreted in breast milk.  Do not breastfeed during treatment and for about 4 weeks after the last dose.
Stelara Pregnancy And Lactation Text: This medication is Pregnancy Category B and is considered safe during pregnancy. It is unknown if this medication is excreted in breast milk.
Rituxan Counseling:  I discussed with the patient the risks of Rituxan infusions. Side effects can include infusion reactions, severe drug rashes including mucocutaneous reactions, reactivation of latent hepatitis and other infections and rarely progressive multifocal leukoencephalopathy.  All of the patient's questions and concerns were addressed.
Spironolactone Pregnancy And Lactation Text: This medication can cause feminization of the male fetus and should be avoided during pregnancy. The active metabolite is also found in breast milk.
Wartpeel Pregnancy And Lactation Text: This medication is Pregnancy Category X and contraindicated in pregnancy and in women who may become pregnant. It is unknown if this medication is excreted in breast milk.
Ivermectin Counseling:  Patient instructed to take medication on an empty stomach with a full glass of water.  Patient informed of potential adverse effects including but not limited to nausea, diarrhea, dizziness, itching, and swelling of the extremities or lymph nodes.  The patient verbalized understanding of the proper use and possible adverse effects of ivermectin.  All of the patient's questions and concerns were addressed.
Drysol Pregnancy And Lactation Text: This medication is considered safe during pregnancy and breast feeding.
Methotrexate Counseling:  Patient counseled regarding adverse effects of methotrexate including but not limited to nausea, vomiting, abnormalities in liver function tests. Patients may develop mouth sores, rash, diarrhea, and abnormalities in blood counts. The patient understands that monitoring is required including LFT's and blood counts.  There is a rare possibility of scarring of the liver and lung problems that can occur when taking methotrexate. Persistent nausea, loss of appetite, pale stools, dark urine, cough, and shortness of breath should be reported immediately. Patient advised to discontinue methotrexate treatment at least three months before attempting to become pregnant.  I discussed the need for folate supplements while taking methotrexate.  These supplements can decrease side effects during methotrexate treatment. The patient verbalized understanding of the proper use and possible adverse effects of methotrexate.  All of the patient's questions and concerns were addressed.
Valtrex Pregnancy And Lactation Text: this medication is Pregnancy Category B and is considered safe during pregnancy. This medication is not directly found in breast milk but it's metabolite acyclovir is present.
Imiquimod Counseling:  I discussed with the patient the risks of imiquimod including but not limited to erythema, scaling, itching, weeping, crusting, and pain.  Patient understands that the inflammatory response to imiquimod is variable from person to person and was educated regarded proper titration schedule.  If flu-like symptoms develop, patient knows to discontinue the medication and contact us.
Dapsone Pregnancy And Lactation Text: This medication is Pregnancy Category C and is not considered safe during pregnancy or breast feeding.
Doxycycline Pregnancy And Lactation Text: This medication is Pregnancy Category D and not consider safe during pregnancy. It is also excreted in breast milk but is considered safe for shorter treatment courses.
Benzoyl Peroxide Counseling: Patient counseled that medicine may cause skin irritation and bleach clothing.  In the event of skin irritation, the patient was advised to reduce the amount of the drug applied or use it less frequently.   The patient verbalized understanding of the proper use and possible adverse effects of benzoyl peroxide.  All of the patient's questions and concerns were addressed.
Opioid Counseling: I discussed with the patient the potential side effects of opioids including but not limited to addiction, altered mental status, and depression. I stressed avoiding alcohol, benzodiazepines, muscle relaxants and sleep aids unless specifically okayed by a physician. The patient verbalized understanding of the proper use and possible adverse effects of opioids. All of the patient's questions and concerns were addressed. They were instructed to flush the remaining pills down the toilet if they did not need them for pain.
Rinvoq Pregnancy And Lactation Text: Based on animal studies, Rinvoq may cause embryo-fetal harm when administered to pregnant women.  The medication should not be used in pregnancy.  Breastfeeding is not recommended during treatment and for 6 days after the last dose.
Topical Ketoconazole Counseling: Patient counseled that this medication may cause skin irritation or allergic reactions.  In the event of skin irritation, the patient was advised to reduce the amount of the drug applied or use it less frequently.   The patient verbalized understanding of the proper use and possible adverse effects of ketoconazole.  All of the patient's questions and concerns were addressed.
Niacinamide Pregnancy And Lactation Text: These medications are considered safe during pregnancy.
Libtayo Pregnancy And Lactation Text: This medication is contraindicated in pregnancy and when breast feeding.
Dupixent Pregnancy And Lactation Text: This medication likely crosses the placenta but the risk for the fetus is uncertain. This medication is excreted in breast milk.
Solaraze Pregnancy And Lactation Text: This medication is Pregnancy Category B and is considered safe. There is some data to suggest avoiding during the third trimester. It is unknown if this medication is excreted in breast milk.
Propranolol Counseling:  I discussed with the patient the risks of propranolol including but not limited to low heart rate, low blood pressure, low blood sugar, restlessness and increased cold sensitivity. They should call the office if they experience any of these side effects.
Quinolones Counseling:  I discussed with the patient the risks of fluoroquinolones including but not limited to GI upset, allergic reaction, drug rash, diarrhea, dizziness, photosensitivity, yeast infections, liver function test abnormalities, tendonitis/tendon rupture.
Fluconazole Pregnancy And Lactation Text: This medication is Pregnancy Category C and it isn't know if it is safe during pregnancy. It is also excreted in breast milk.
Isotretinoin Counseling: Patient should get monthly blood tests, not donate blood, not drive at night if vision affected, not share medication, and not undergo elective surgery for 6 months after tx completed. Side effects reviewed, pt to contact office should one occur.
Azithromycin Counseling:  I discussed with the patient the risks of azithromycin including but not limited to GI upset, allergic reaction, drug rash, diarrhea, and yeast infections.
Winlevi Counseling:  I discussed with the patient the risks of topical clascoterone including but not limited to erythema, scaling, itching, and stinging. Patient voiced their understanding.
Taltz Counseling: I discussed with the patient the risks of ixekizumab including but not limited to immunosuppression, serious infections, worsening of inflammatory bowel disease and drug reactions.  The patient understands that monitoring is required including a PPD at baseline and must alert us or the primary physician if symptoms of infection or other concerning signs are noted.
Picato Counseling:  I discussed with the patient the risks of Picato including but not limited to erythema, scaling, itching, weeping, crusting, and pain.
Rituxan Pregnancy And Lactation Text: This medication is Pregnancy Category C and it isn't know if it is safe during pregnancy. It is unknown if this medication is excreted in breast milk but similar antibodies are known to be excreted.
Cimetidine Counseling:  I discussed with the patient the risks of Cimetidine including but not limited to gynecomastia, headache, diarrhea, nausea, drowsiness, arrhythmias, pancreatitis, skin rashes, psychosis, bone marrow suppression and kidney toxicity.
Methotrexate Pregnancy And Lactation Text: This medication is Pregnancy Category X and is known to cause fetal harm. This medication is excreted in breast milk.
Use Enhanced Medication Counseling?: No
Imiquimod Pregnancy And Lactation Text: This medication is Pregnancy Category C. It is unknown if this medication is excreted in breast milk.
Ivermectin Pregnancy And Lactation Text: This medication is Pregnancy Category C and it isn't known if it is safe during pregnancy. It is also excreted in breast milk.
Topical Ketoconazole Pregnancy And Lactation Text: This medication is Pregnancy Category B and is considered safe during pregnancy. It is unknown if it is excreted in breast milk.
Benzoyl Peroxide Pregnancy And Lactation Text: This medication is Pregnancy Category C. It is unknown if benzoyl peroxide is excreted in breast milk.
Opioid Pregnancy And Lactation Text: These medications can lead to premature delivery and should be avoided during pregnancy. These medications are also present in breast milk in small amounts.
Sotyktu Counseling:  I discussed the most common side effects of Sotyktu including: common cold, sore throat, sinus infections, cold sores, canker sores, folliculitis, and acne.? I also discussed more serious side effects of Sotyktu including but not limited to: serious allergic reactions; increased risk for infections such as TB; cancers such as lymphomas; rhabdomyolysis and elevated CPK; and elevated triglycerides and liver enzymes.?
Niacinamide Counseling: I recommended taking niacin or niacinamide, also know as vitamin B3, twice daily. Recent evidence suggests that taking vitamin B3 (500 mg twice daily) can reduce the risk of actinic keratoses and non-melanoma skin cancers. Side effects of vitamin B3 include flushing and headache.
Enbrel Counseling:  I discussed with the patient the risks of etanercept including but not limited to myelosuppression, immunosuppression, autoimmune hepatitis, demyelinating diseases, lymphoma, and infections.  The patient understands that monitoring is required including a PPD at baseline and must alert us or the primary physician if symptoms of infection or other concerning signs are noted.
Azathioprine Counseling:  I discussed with the patient the risks of azathioprine including but not limited to myelosuppression, immunosuppression, hepatotoxicity, lymphoma, and infections.  The patient understands that monitoring is required including baseline LFTs, Creatinine, possible TPMP genotyping and weekly CBCs for the first month and then every 2 weeks thereafter.  The patient verbalized understanding of the proper use and possible adverse effects of azathioprine.  All of the patient's questions and concerns were addressed.
Propranolol Pregnancy And Lactation Text: This medication is Pregnancy Category C and it isn't known if it is safe during pregnancy. It is excreted in breast milk.
Erythromycin Counseling:  I discussed with the patient the risks of erythromycin including but not limited to GI upset, allergic reaction, drug rash, diarrhea, increase in liver enzymes, and yeast infections.
Soolantra Counseling: I discussed with the patients the risks of topial Soolantra. This is a medicine which decreases the number of mites and inflammation in the skin. You experience burning, stinging, eye irritation or allergic reactions.  Please call our office if you develop any problems from using this medication.
Gabapentin Counseling: I discussed with the patient the risks of gabapentin including but not limited to dizziness, somnolence, fatigue and ataxia.
Taltz Pregnancy And Lactation Text: The risk during pregnancy and breastfeeding is uncertain with this medication.
Libtayo Counseling- I discussed with the patient the risks of Libtayo including but not limited to nausea, vomiting, diarrhea, and bone or muscle pain.  The patient verbalized understanding of the proper use and possible adverse effects of Libtayo.  All of the patient's questions and concerns were addressed.
Olanzapine Counseling- I discussed with the patient the common side effects of olanzapine including but are not limited to: lack of energy, dry mouth, increased appetite, sleepiness, tremor, constipation, dizziness, changes in behavior, or restlessness.  Explained that teenagers are more likely to experience headaches, abdominal pain, pain in the arms or legs, tiredness, and sleepiness.  Serious side effects include but are not limited: increased risk of death in elderly patients who are confused, have memory loss, or dementia-related psychosis; hyperglycemia; increased cholesterol and triglycerides; and weight gain.
Isotretinoin Pregnancy And Lactation Text: This medication is Pregnancy Category X and is considered extremely dangerous during pregnancy. It is unknown if it is excreted in breast milk.
Azithromycin Pregnancy And Lactation Text: This medication is considered safe during pregnancy and is also secreted in breast milk.
Griseofulvin Counseling:  I discussed with the patient the risks of griseofulvin including but not limited to photosensitivity, cytopenia, liver damage, nausea/vomiting and severe allergy.  The patient understands that this medication is best absorbed when taken with a fatty meal (e.g., ice cream or french fries).
Cimetidine Pregnancy And Lactation Text: This medication is Pregnancy Category B and is considered safe during pregnancy. It is also excreted in breast milk and breast feeding isn't recommended.
Klisyri Counseling:  I discussed with the patient the risks of Klisyri including but not limited to erythema, scaling, itching, weeping, crusting, and pain.
Winlevi Pregnancy And Lactation Text: This medication is considered safe during pregnancy and breastfeeding.
Siliq Counseling:  I discussed with the patient the risks of Siliq including but not limited to new or worsening depression, suicidal thoughts and behavior, immunosuppression, malignancy, posterior leukoencephalopathy syndrome, and serious infections.  The patient understands that monitoring is required including a PPD at baseline and must alert us or the primary physician if symptoms of infection or other concerning signs are noted. There is also a special program designed to monitor depression which is required with Siliq.
Prednisone Counseling:  I discussed with the patient the risks of prolonged use of prednisone including but not limited to weight gain, insomnia, osteoporosis, mood changes, diabetes, susceptibility to infection, glaucoma and high blood pressure.  In cases where prednisone use is prolonged, patients should be monitored with blood pressure checks, serum glucose levels and an eye exam.  Additionally, the patient may need to be placed on GI prophylaxis, PCP prophylaxis, and calcium and vitamin D supplementation and/or a bisphosphonate.  The patient verbalized understanding of the proper use and the possible adverse effects of prednisone.  All of the patient's questions and concerns were addressed.
Sotyktu Pregnancy And Lactation Text: There is insufficient data to evaluate whether or not Sotyktu is safe to use during pregnancy.? ?It is not known if Sotyktu passes into breast milk and whether or not it is safe to use when breastfeeding.??
Topical Metronidazole Counseling: Metronidazole is a topical antibiotic medication. You may experience burning, stinging, redness, or allergic reactions.  Please call our office if you develop any problems from using this medication.
Low Dose Naltrexone Pregnancy And Lactation Text: Naltrexone is pregnancy category C.  There have been no adequate and well-controlled studies in pregnant women.  It should be used in pregnancy only if the potential benefit justifies the potential risk to the fetus.   Limited data indicates that naltrexone is minimally excreted into breastmilk.
Carac Counseling:  I discussed with the patient the risks of Carac including but not limited to erythema, scaling, itching, weeping, crusting, and pain.
Azathioprine Pregnancy And Lactation Text: This medication is Pregnancy Category D and isn't considered safe during pregnancy. It is unknown if this medication is excreted in breast milk.
SSKI Counseling:  I discussed with the patient the risks of SSKI including but not limited to thyroid abnormalities, metallic taste, GI upset, fever, headache, acne, arthralgias, paraesthesias, lymphadenopathy, easy bleeding, arrhythmias, and allergic reaction.
Soolantra Pregnancy And Lactation Text: This medication is Pregnancy Category C. This medication is considered safe during breast feeding.
Erythromycin Pregnancy And Lactation Text: This medication is Pregnancy Category B and is considered safe during pregnancy. It is also excreted in breast milk.
Arava Counseling:  Patient counseled regarding adverse effects of Arava including but not limited to nausea, vomiting, abnormalities in liver function tests. Patients may develop mouth sores, rash, diarrhea, and abnormalities in blood counts. The patient understands that monitoring is required including LFTs and blood counts.  There is a rare possibility of scarring of the liver and lung problems that can occur when taking methotrexate. Persistent nausea, loss of appetite, pale stools, dark urine, cough, and shortness of breath should be reported immediately. Patient advised to discontinue Arava treatment and consult with a physician prior to attempting conception. The patient will have to undergo a treatment to eliminate Arava from the body prior to conception.
Rifampin Counseling: I discussed with the patient the risks of rifampin including but not limited to liver damage, kidney damage, red-orange body fluids, nausea/vomiting and severe allergy.
High Dose Vitamin A Counseling: Side effects reviewed, pt to contact office should one occur.
Erivedge Pregnancy And Lactation Text: This medication is Pregnancy Category X and is absolutely contraindicated during pregnancy. It is unknown if it is excreted in breast milk.
Bactrim Counseling:  I discussed with the patient the risks of sulfa antibiotics including but not limited to GI upset, allergic reaction, drug rash, diarrhea, dizziness, photosensitivity, and yeast infections.  Rarely, more serious reactions can occur including but not limited to aplastic anemia, agranulocytosis, methemoglobinemia, blood dyscrasias, liver or kidney failure, lung infiltrates or desquamative/blistering drug rashes.
Adbry Counseling: I discussed with the patient the risks of tralokinumab including but not limited to eye infection and irritation, cold sores, injection site reactions, worsening of asthma, allergic reactions and increased risk of parasitic infection.  Live vaccines should be avoided while taking tralokinumab. The patient understands that monitoring is required and they must alert us or the primary physician if symptoms of infection or other concerning signs are noted.
Tremfya Counseling: I discussed with the patient the risks of guselkumab including but not limited to immunosuppression, serious infections, and drug reactions.  The patient understands that monitoring is required including a PPD at baseline and must alert us or the primary physician if symptoms of infection or other concerning signs are noted.
Protopic Counseling: Patient may experience a mild burning sensation during topical application. Protopic is not approved in children less than 2 years of age. There have been case reports of hematologic and skin malignancies in patients using topical calcineurin inhibitors although causality is questionable.
Griseofulvin Pregnancy And Lactation Text: This medication is Pregnancy Category X and is known to cause serious birth defects. It is unknown if this medication is excreted in breast milk but breast feeding should be avoided.
Olanzapine Pregnancy And Lactation Text: This medication is pregnancy category C.   There are no adequate and well controlled trials with olanzapine in pregnant females.  Olanzapine should be used during pregnancy only if the potential benefit justifies the potential risk to the fetus.   In a study in lactating healthy women, olanzapine was excreted in breast milk.  It is recommended that women taking olanzapine should not breast feed.
Doxepin Counseling:  Patient advised that the medication is sedating and not to drive a car after taking this medication. Patient informed of potential adverse effects including but not limited to dry mouth, urinary retention, and blurry vision.  The patient verbalized understanding of the proper use and possible adverse effects of doxepin.  All of the patient's questions and concerns were addressed.
VTAMA Counseling: I discussed with the patient that VTAMA is not for use in the eyes, mouth or mouth. They should call the office if they develop any signs of allergic reactions to VTAMA. The patient verbalized understanding of the proper use and possible adverse effects of VTAMA.  All of the patient's questions and concerns were addressed.
Prednisone Pregnancy And Lactation Text: This medication is Pregnancy Category C and it isn't know if it is safe during pregnancy. This medication is excreted in breast milk.
Xeljanz Counseling: I discussed with the patient the risks of Xeljanz therapy including increased risk of infection, liver issues, headache, diarrhea, or cold symptoms. Live vaccines should be avoided. They were instructed to call if they have any problems.
Klisyri Pregnancy And Lactation Text: It is unknown if this medication can harm a developing fetus or if it is excreted in breast milk.
Finasteride Pregnancy And Lactation Text: This medication is absolutely contraindicated during pregnancy. It is unknown if it is excreted in breast milk.
Topical Metronidazole Pregnancy And Lactation Text: This medication is Pregnancy Category B and considered safe during pregnancy.  It is also considered safe to use while breastfeeding.
Low Dose Naltrexone Counseling- I discussed with the patient the potential risks and side effects of low dose naltrexone including but not limited to: more vivid dreams, headaches, nausea, vomiting, abdominal pain, fatigue, dizziness, and anxiety.
Humira Counseling:  I discussed with the patient the risks of adalimumab including but not limited to myelosuppression, immunosuppression, autoimmune hepatitis, demyelinating diseases, lymphoma, and serious infections.  The patient understands that monitoring is required including a PPD at baseline and must alert us or the primary physician if symptoms of infection or other concerning signs are noted.
Cellcept Counseling:  I discussed with the patient the risks of mycophenolate mofetil including but not limited to infection/immunosuppression, GI upset, hypokalemia, hypercholesterolemia, bone marrow suppression, lymphoproliferative disorders, malignancy, GI ulceration/bleed/perforation, colitis, interstitial lung disease, kidney failure, progressive multifocal leukoencephalopathy, and birth defects.  The patient understands that monitoring is required including a baseline creatinine and regular CBC testing. In addition, patient must alert us immediately if symptoms of infection or other concerning signs are noted.
Sski Pregnancy And Lactation Text: This medication is Pregnancy Category D and isn't considered safe during pregnancy. It is excreted in breast milk.
Rifampin Pregnancy And Lactation Text: This medication is Pregnancy Category C and it isn't know if it is safe during pregnancy. It is also excreted in breast milk and should not be used if you are breast feeding.
Elidel Counseling: Patient may experience a mild burning sensation during topical application. Elidel is not approved in children less than 2 years of age. There have been case reports of hematologic and skin malignancies in patients using topical calcineurin inhibitors although causality is questionable.
Erivedge Counseling- I discussed with the patient the risks of Erivedge including but not limited to nausea, vomiting, diarrhea, constipation, weight loss, changes in the sense of taste, decreased appetite, muscle spasms, and hair loss.  The patient verbalized understanding of the proper use and possible adverse effects of Erivedge.  All of the patient's questions and concerns were addressed.
High Dose Vitamin A Pregnancy And Lactation Text: High dose vitamin A therapy is contraindicated during pregnancy and breast feeding.
Bactrim Pregnancy And Lactation Text: This medication is Pregnancy Category D and is known to cause fetal risk.  It is also excreted in breast milk.
Topical Retinoid counseling:  Patient advised to apply a pea-sized amount only at bedtime and wait 30 minutes after washing their face before applying.  If too drying, patient may add a non-comedogenic moisturizer. The patient verbalized understanding of the proper use and possible adverse effects of retinoids.  All of the patient's questions and concerns were addressed.
Itraconazole Counseling:  I discussed with the patient the risks of itraconazole including but not limited to liver damage, nausea/vomiting, neuropathy, and severe allergy.  The patient understands that this medication is best absorbed when taken with acidic beverages such as non-diet cola or ginger ale.  The patient understands that monitoring is required including baseline LFTs and repeat LFTs at intervals.  The patient understands that they are to contact us or the primary physician if concerning signs are noted.
Adbry Pregnancy And Lactation Text: It is unknown if this medication will adversely affect pregnancy or breast feeding.
Oral Minoxidil Counseling- I discussed with the patient the risks of oral minoxidil including but not limited to shortness of breath, swelling of the feet or ankles, dizziness, lightheadedness, unwanted hair growth and allergic reaction.  The patient verbalized understanding of the proper use and possible adverse effects of oral minoxidil.  All of the patient's questions and concerns were addressed.
Doxepin Pregnancy And Lactation Text: This medication is Pregnancy Category C and it isn't known if it is safe during pregnancy. It is also excreted in breast milk and breast feeding isn't recommended.
Protopic Pregnancy And Lactation Text: This medication is Pregnancy Category C. It is unknown if this medication is excreted in breast milk when applied topically.
Simponi Counseling:  I discussed with the patient the risks of golimumab including but not limited to myelosuppression, immunosuppression, autoimmune hepatitis, demyelinating diseases, lymphoma, and serious infections.  The patient understands that monitoring is required including a PPD at baseline and must alert us or the primary physician if symptoms of infection or other concerning signs are noted.
Vtama Pregnancy And Lactation Text: It is unknown if this medication can cause problems during pregnancy and breastfeeding.
Topical Steroids Counseling: I discussed with the patient that prolonged use of topical steroids can result in the increased appearance of superficial blood vessels (telangiectasias), lightening (hypopigmentation) and thinning of the skin (atrophy).  Patient understands to avoid using high potency steroids in skin folds, the groin or the face.  The patient verbalized understanding of the proper use and possible adverse effects of topical steroids.  All of the patient's questions and concerns were addressed.
Minoxidil Counseling: Minoxidil is a topical medication which can increase blood flow where it is applied. It is uncertain how this medication increases hair growth. Side effects are uncommon and include stinging and allergic reactions.
Finasteride Counseling:  I discussed with the patient the risks of use of finasteride including but not limited to decreased libido, decreased ejaculate volume, gynecomastia, and depression. Women should not handle medication.  All of the patient's questions and concerns were addressed.
Thalidomide Counseling: I discussed with the patient the risks of thalidomide including but not limited to birth defects, anxiety, weakness, chest pain, dizziness, cough and severe allergy.
Calcipotriene Counseling:  I discussed with the patient the risks of calcipotriene including but not limited to erythema, scaling, itching, and irritation.
Sarecycline Counseling: Patient advised regarding possible photosensitivity and discoloration of the teeth, skin, lips, tongue and gums.  Patient instructed to avoid sunlight, if possible.  When exposed to sunlight, patients should wear protective clothing, sunglasses, and sunscreen.  The patient was instructed to call the office immediately if the following severe adverse effects occur:  hearing changes, easy bruising/bleeding, severe headache, or vision changes.  The patient verbalized understanding of the proper use and possible adverse effects of sarecycline.  All of the patient's questions and concerns were addressed.
Xelyanz Pregnancy And Lactation Text: This medication is Pregnancy Category D and is not considered safe during pregnancy.  The risk during breast feeding is also uncertain.
Hydroxychloroquine Pregnancy And Lactation Text: This medication has been shown to cause fetal harm but it isn't assigned a Pregnancy Risk Category. There are small amounts excreted in breast milk.
Cimzia Counseling:  I discussed with the patient the risks of Cimzia including but not limited to immunosuppression, allergic reactions and infections.  The patient understands that monitoring is required including a PPD at baseline and must alert us or the primary physician if symptoms of infection or other concerning signs are noted.
Cibinqo Counseling: I discussed with the patient the risks of Cibinqo therapy including but not limited to common cold, nausea, headache, cold sores, increased blood CPK levels, dizziness, UTIs, fatigue, acne, and vomitting. Live vaccines should be avoided.  This medication has been linked to serious infections; higher rate of mortality; malignancy and lymphoproliferative disorders; major adverse cardiovascular events; thrombosis; thrombocytopenia and lymphopenia; lipid elevations; and retinal detachment.
Clofazimine Counseling:  I discussed with the patient the risks of clofazimine including but not limited to skin and eye pigmentation, liver damage, nausea/vomiting, gastrointestinal bleeding and allergy.
Oral Minoxidil Pregnancy And Lactation Text: This medication should only be used when clearly needed if you are pregnant, attempting to become pregnant or breast feeding.
Calcipotriene Pregnancy And Lactation Text: The use of this medication during pregnancy or lactation is not recommended as there is insufficient data.
Qbrexza Counseling:  I discussed with the patient the risks of Qbrexza including but not limited to headache, mydriasis, blurred vision, dry eyes, nasal dryness, dry mouth, dry throat, dry skin, urinary hesitation, and constipation.  Local skin reactions including erythema, burning, stinging, and itching can also occur.
Cephalexin Counseling: I counseled the patient regarding use of cephalexin as an antibiotic for prophylactic and/or therapeutic purposes. Cephalexin (commonly prescribed under brand name Keflex) is a cephalosporin antibiotic which is active against numerous classes of bacteria, including most skin bacteria. Side effects may include nausea, diarrhea, gastrointestinal upset, rash, hives, yeast infections, and in rare cases, hepatitis, kidney disease, seizures, fever, confusion, neurologic symptoms, and others. Patients with severe allergies to penicillin medications are cautioned that there is about a 10% incidence of cross-reactivity with cephalosporins. When possible, patients with penicillin allergies should use alternatives to cephalosporins for antibiotic therapy.
Xolair Pregnancy And Lactation Text: This medication is Pregnancy Category B and is considered safe during pregnancy. This medication is excreted in breast milk.
Zoryve Counseling:  I discussed with the patient that Zoryve is not for use in the eyes, mouth or vagina. The most commonly reported side effects include diarrhea, headache, insomnia, application site pain, upper respiratory tract infections, and urinary tract infections.  All of the patient's questions and concerns were addressed.
Hydroxyzine Counseling: Patient advised that the medication is sedating and not to drive a car after taking this medication.  Patient informed of potential adverse effects including but not limited to dry mouth, urinary retention, and blurry vision.  The patient verbalized understanding of the proper use and possible adverse effects of hydroxyzine.  All of the patient's questions and concerns were addressed.
Topical Steroids Applications Pregnancy And Lactation Text: Most topical steroids are considered safe to use during pregnancy and lactation.  Any topical steroid applied to the breast or nipple should be washed off before breastfeeding.
Minoxidil Pregnancy And Lactation Text: This medication has not been assigned a Pregnancy Risk Category but animal studies failed to show danger with the topical medication. It is unknown if the medication is excreted in breast milk.
Cantharidin Counseling:  I discussed with the patient the risks of Cantharidin including but not limited to pain, redness, burning, itching, and blistering.
Eucrisa Counseling: Patient may experience a mild burning sensation during topical application. Eucrisa is not approved in children less than 2 years of age.
Dutasteride Pregnancy And Lactation Text: This medication is absolutely contraindicated in women, especially during pregnancy and breast feeding. Feminization of male fetuses is possible if taking while pregnant.
Sarecycline Pregnancy And Lactation Text: This medication is Pregnancy Category D and not consider safe during pregnancy. It is also excreted in breast milk.
Hydroxychloroquine Counseling:  I discussed with the patient that a baseline ophthalmologic exam is needed at the start of therapy and every year thereafter while on therapy. A CBC may also be warranted for monitoring.  The side effects of this medication were discussed with the patient, including but not limited to agranulocytosis, aplastic anemia, seizures, rashes, retinopathy, and liver toxicity. Patient instructed to call the office should any adverse effect occur.  The patient verbalized understanding of the proper use and possible adverse effects of Plaquenil.  All the patient's questions and concerns were addressed.
Ilumya Counseling: I discussed with the patient the risks of tildrakizumab including but not limited to immunosuppression, malignancy, posterior leukoencephalopathy syndrome, and serious infections.  The patient understands that monitoring is required including a PPD at baseline and must alert us or the primary physician if symptoms of infection or other concerning signs are noted.
Cyclophosphamide Counseling:  I discussed with the patient the risks of cyclophosphamide including but not limited to hair loss, hormonal abnormalities, decreased fertility, abdominal pain, diarrhea, nausea and vomiting, bone marrow suppression and infection. The patient understands that monitoring is required while taking this medication.
Cibinqo Pregnancy And Lactation Text: It is unknown if this medication will adversely affect pregnancy or breast feeding.  You should not take this medication if you are currently pregnant or planning a pregnancy or while breastfeeding.
Tazorac Counseling:  Patient advised that medication is irritating and drying.  Patient may need to apply sparingly and wash off after an hour before eventually leaving it on overnight.  The patient verbalized understanding of the proper use and possible adverse effects of tazorac.  All of the patient's questions and concerns were addressed.
Cimzia Pregnancy And Lactation Text: This medication crosses the placenta but can be considered safe in certain situations. Cimzia may be excreted in breast milk.
Cephalexin Pregnancy And Lactation Text: This medication is Pregnancy Category B and considered safe during pregnancy.  It is also excreted in breast milk but can be used safely for shorter doses.
Aklief counseling:  Patient advised to apply a pea-sized amount only at bedtime and wait 30 minutes after washing their face before applying.  If too drying, patient may add a non-comedogenic moisturizer.  The most commonly reported side effects including irritation, redness, scaling, dryness, stinging, burning, itching, and increased risk of sunburn.  The patient verbalized understanding of the proper use and possible adverse effects of retinoids.  All of the patient's questions and concerns were addressed.
Qbrexza Pregnancy And Lactation Text: There is no available data on Qbrexza use in pregnant women.  There is no available data on Qbrexza use in lactation.
Otezla Counseling: The side effects of Otezla were discussed with the patient, including but not limited to worsening or new depression, weight loss, diarrhea, nausea, upper respiratory tract infection, and headache. Patient instructed to call the office should any adverse effect occur.  The patient verbalized understanding of the proper use and possible adverse effects of Otezla.  All the patient's questions and concerns were addressed.
Ketoconazole Counseling:   Patient counseled regarding improving absorption with orange juice.  Adverse effects include but are not limited to breast enlargement, headache, diarrhea, nausea, upset stomach, liver function test abnormalities, taste disturbance, and stomach pain.  There is a rare possibility of liver failure that can occur when taking ketoconazole. The patient understands that monitoring of LFTs may be required, especially at baseline. The patient verbalized understanding of the proper use and possible adverse effects of ketoconazole.  All of the patient's questions and concerns were addressed.
Metronidazole Counseling:  I discussed with the patient the risks of metronidazole including but not limited to seizures, nausea/vomiting, a metallic taste in the mouth, nausea/vomiting and severe allergy.
Clofazimine Pregnancy And Lactation Text: This medication is Pregnancy Category C and isn't considered safe during pregnancy. It is excreted in breast milk.
Cantharidin Pregnancy And Lactation Text: This medication has not been proven safe during pregnancy. It is unknown if this medication is excreted in breast milk.
Skyrizi Counseling: I discussed with the patient the risks of risankizumab-rzaa including but not limited to immunosuppression, and serious infections.  The patient understands that monitoring is required including a PPD at baseline and must alert us or the primary physician if symptoms of infection or other concerning signs are noted.
Acitretin Counseling:  I discussed with the patient the risks of acitretin including but not limited to hair loss, dry lips/skin/eyes, liver damage, hyperlipidemia, depression/suicidal ideation, photosensitivity.  Serious rare side effects can include but are not limited to pancreatitis, pseudotumor cerebri, bony changes, clot formation/stroke/heart attack.  Patient understands that alcohol is contraindicated since it can result in liver toxicity and significantly prolong the elimination of the drug by many years.
Detail Level: Simple
Xolair Counseling:  Patient informed of potential adverse effects including but not limited to fever, muscle aches, rash and allergic reactions.  The patient verbalized understanding of the proper use and possible adverse effects of Xolair.  All of the patient's questions and concerns were addressed.
Hydroxyzine Pregnancy And Lactation Text: This medication is not safe during pregnancy and should not be taken. It is also excreted in breast milk and breast feeding isn't recommended.
Mirvaso Counseling: Mirvaso is a topical medication which can decrease superficial blood flow where applied. Side effects are uncommon and include stinging, redness and allergic reactions.
Birth Control Pills Counseling: Birth Control Pill Counseling: I discussed with the patient the potential side effects of OCPs including but not limited to increased risk of stroke, heart attack, thrombophlebitis, deep venous thrombosis, hepatic adenomas, breast changes, GI upset, headaches, and depression.  The patient verbalized understanding of the proper use and possible adverse effects of OCPs. All of the patient's questions and concerns were addressed.
5-Fu Counseling: 5-Fluorouracil Counseling:  I discussed with the patient the risks of 5-fluorouracil including but not limited to erythema, scaling, itching, weeping, crusting, and pain.
Dutasteride Counseling: Dustasteride Counseling:  I discussed with the patient the risks of use of dutasteride including but not limited to decreased libido, decreased ejaculate volume, and gynecomastia. Women who can become pregnant should not handle medication.  All of the patient's questions and concerns were addressed.
Topical Sulfur Applications Counseling: Topical Sulfur Counseling: Patient counseled that this medication may cause skin irritation or allergic reactions.  In the event of skin irritation, the patient was advised to reduce the amount of the drug applied or use it less frequently.   The patient verbalized understanding of the proper use and possible adverse effects of topical sulfur application.  All of the patient's questions and concerns were addressed.
Glycopyrrolate Pregnancy And Lactation Text: This medication is Pregnancy Category B and is considered safe during pregnancy. It is unknown if it is excreted breast milk.
Tazorac Pregnancy And Lactation Text: This medication is not safe during pregnancy. It is unknown if this medication is excreted in breast milk.
Cyclophosphamide Pregnancy And Lactation Text: This medication is Pregnancy Category D and it isn't considered safe during pregnancy. This medication is excreted in breast milk.
Tranexamic Acid Counseling:  Patient advised of the small risk of bleeding problems with tranexamic acid. They were also instructed to call if they developed any nausea, vomiting or diarrhea. All of the patient's questions and concerns were addressed.
Olumiant Counseling: I discussed with the patient the risks of Olumiant therapy including but not limited to upper respiratory tract infections, shingles, cold sores, and nausea. Live vaccines should be avoided.  This medication has been linked to serious infections; higher rate of mortality; malignancy and lymphoproliferative disorders; major adverse cardiovascular events; thrombosis; gastrointestinal perforations; neutropenia; lymphopenia; anemia; liver enzyme elevations; and lipid elevations.
Tetracycline Counseling: Patient counseled regarding possible photosensitivity and increased risk for sunburn.  Patient instructed to avoid sunlight, if possible.  When exposed to sunlight, patients should wear protective clothing, sunglasses, and sunscreen.  The patient was instructed to call the office immediately if the following severe adverse effects occur:  hearing changes, easy bruising/bleeding, severe headache, or vision changes.  The patient verbalized understanding of the proper use and possible adverse effects of tetracycline.  All of the patient's questions and concerns were addressed. Patient understands to avoid pregnancy while on therapy due to potential birth defects.
Cosentyx Counseling:  I discussed with the patient the risks of Cosentyx including but not limited to worsening of Crohn's disease, immunosuppression, allergic reactions and infections.  The patient understands that monitoring is required including a PPD at baseline and must alert us or the primary physician if symptoms of infection or other concerning signs are noted.
Aklief Pregnancy And Lactation Text: It is unknown if this medication is safe to use during pregnancy.  It is unknown if this medication is excreted in breast milk.  Breastfeeding women should use the topical cream on the smallest area of the skin for the shortest time needed while breastfeeding.  Do not apply to nipple and areola.
Ketoconazole Pregnancy And Lactation Text: This medication is Pregnancy Category C and it isn't know if it is safe during pregnancy. It is also excreted in breast milk and breast feeding isn't recommended.
Otezla Pregnancy And Lactation Text: This medication is Pregnancy Category C and it isn't known if it is safe during pregnancy. It is unknown if it is excreted in breast milk.
Clindamycin Counseling: I counseled the patient regarding use of clindamycin as an antibiotic for prophylactic and/or therapeutic purposes. Clindamycin is active against numerous classes of bacteria, including skin bacteria. Side effects may include nausea, diarrhea, gastrointestinal upset, rash, hives, yeast infections, and in rare cases, colitis.
Colchicine Counseling:  Patient counseled regarding adverse effects including but not limited to stomach upset (nausea, vomiting, stomach pain, or diarrhea).  Patient instructed to limit alcohol consumption while taking this medication.  Colchicine may reduce blood counts especially with prolonged use.  The patient understands that monitoring of kidney function and blood counts may be required, especially at baseline. The patient verbalized understanding of the proper use and possible adverse effects of colchicine.  All of the patient's questions and concerns were addressed.
Metronidazole Pregnancy And Lactation Text: This medication is Pregnancy Category B and considered safe during pregnancy.  It is also excreted in breast milk.
Acitretin Pregnancy And Lactation Text: This medication is Pregnancy Category X and should not be given to women who are pregnant or may become pregnant in the future. This medication is excreted in breast milk.
Zyclara Counseling:  I discussed with the patient the risks of imiquimod including but not limited to erythema, scaling, itching, weeping, crusting, and pain.  Patient understands that the inflammatory response to imiquimod is variable from person to person and was educated regarded proper titration schedule.  If flu-like symptoms develop, patient knows to discontinue the medication and contact us.
Rhofade Counseling: Rhofade is a topical medication which can decrease superficial blood flow where applied. Side effects are uncommon and include stinging, redness and allergic reactions.
Mirvaso Pregnancy And Lactation Text: This medication has not been assigned a Pregnancy Risk Category. It is unknown if the medication is excreted in breast milk.
Birth Control Pills Pregnancy And Lactation Text: This medication should be avoided if pregnant and for the first 30 days post-partum.
Topical Sulfur Applications Pregnancy And Lactation Text: This medication is considered safe during pregnancy and breast feeding secondary to limited systemic absorption.
Glycopyrrolate Counseling:  I discussed with the patient the risks of glycopyrrolate including but not limited to skin rash, drowsiness, dry mouth, difficulty urinating, and blurred vision.
Infliximab Counseling:  I discussed with the patient the risks of infliximab including but not limited to myelosuppression, immunosuppression, autoimmune hepatitis, demyelinating diseases, lymphoma, and serious infections.  The patient understands that monitoring is required including a PPD at baseline and must alert us or the primary physician if symptoms of infection or other concerning signs are noted.
Cyclosporine Counseling:  I discussed with the patient the risks of cyclosporine including but not limited to hypertension, gingival hyperplasia,myelosuppression, immunosuppression, liver damage, kidney damage, neurotoxicity, lymphoma, and serious infections. The patient understands that monitoring is required including baseline blood pressure, CBC, CMP, lipid panel and uric acid, and then 1-2 times monthly CMP and blood pressure.
Tranexamic Acid Pregnancy And Lactation Text: It is unknown if this medication is safe during pregnancy or breast feeding.
Albendazole Counseling:  I discussed with the patient the risks of albendazole including but not limited to cytopenia, kidney damage, nausea/vomiting and severe allergy.  The patient understands that this medication is being used in an off-label manner.
Hydroquinone Counseling:  Patient advised that medication may result in skin irritation, lightening (hypopigmentation), dryness, and burning.  In the event of skin irritation, the patient was advised to reduce the amount of the drug applied or use it less frequently.  Rarely, spots that are treated with hydroquinone can become darker (pseudoochronosis).  Should this occur, patient instructed to stop medication and call the office. The patient verbalized understanding of the proper use and possible adverse effects of hydroquinone.  All of the patient's questions and concerns were addressed.
Topical Clindamycin Counseling: Patient counseled that this medication may cause skin irritation or allergic reactions.  In the event of skin irritation, the patient was advised to reduce the amount of the drug applied or use it less frequently.   The patient verbalized understanding of the proper use and possible adverse effects of clindamycin.  All of the patient's questions and concerns were addressed.
Terbinafine Counseling: Patient counseling regarding adverse effects of terbinafine including but not limited to headache, diarrhea, rash, upset stomach, liver function test abnormalities, itching, taste/smell disturbance, nausea, abdominal pain, and flatulence.  There is a rare possibility of liver failure that can occur when taking terbinafine.  The patient understands that a baseline LFT and kidney function test may be required. The patient verbalized understanding of the proper use and possible adverse effects of terbinafine.  All of the patient's questions and concerns were addressed.
Azelaic Acid Counseling: Patient counseled that medicine may cause skin irritation and to avoid applying near the eyes.  In the event of skin irritation, the patient was advised to reduce the amount of the drug applied or use it less frequently.   The patient verbalized understanding of the proper use and possible adverse effects of azelaic acid.  All of the patient's questions and concerns were addressed.
Oxybutynin Counseling:  I discussed with the patient the risks of oxybutynin including but not limited to skin rash, drowsiness, dry mouth, difficulty urinating, and blurred vision.
Clindamycin Pregnancy And Lactation Text: This medication can be used in pregnancy if certain situations. Clindamycin is also present in breast milk.
Olumiant Pregnancy And Lactation Text: Based on animal studies, Olumiant may cause embryo-fetal harm when administered to pregnant women.  The medication should not be used in pregnancy.  Breastfeeding is not recommended during treatment.
Minocycline Counseling: Patient advised regarding possible photosensitivity and discoloration of the teeth, skin, lips, tongue and gums.  Patient instructed to avoid sunlight, if possible.  When exposed to sunlight, patients should wear protective clothing, sunglasses, and sunscreen.  The patient was instructed to call the office immediately if the following severe adverse effects occur:  hearing changes, easy bruising/bleeding, severe headache, or vision changes.  The patient verbalized understanding of the proper use and possible adverse effects of minocycline.  All of the patient's questions and concerns were addressed.
Nsaids Pregnancy And Lactation Text: These medications are considered safe up to 30 weeks gestation. It is excreted in breast milk.
Bexarotene Counseling:  I discussed with the patient the risks of bexarotene including but not limited to hair loss, dry lips/skin/eyes, liver abnormalities, hyperlipidemia, pancreatitis, depression/suicidal ideation, photosensitivity, drug rash/allergic reactions, hypothyroidism, anemia, leukopenia, infection, cataracts, and teratogenicity.  Patient understands that they will need regular blood tests to check lipid profile, liver function tests, white blood cell count, thyroid function tests and pregnancy test if applicable.
Opzelura Counseling:  I discussed with the patient the risks of Opzelura including but not limited to nasopharngitis, bronchitis, ear infection, eosinophila, hives, diarrhea, folliculitis, tonsillitis, and rhinorrhea.  Taken orally, this medication has been linked to serious infections; higher rate of mortality; malignancy and lymphoproliferative disorders; major adverse cardiovascular events; thrombosis; thrombocytopenia, anemia, and neutropenia; and lipid elevations.
Stelara Counseling:  I discussed with the patient the risks of ustekinumab including but not limited to immunosuppression, malignancy, posterior leukoencephalopathy syndrome, and serious infections.  The patient understands that monitoring is required including a PPD at baseline and must alert us or the primary physician if symptoms of infection or other concerning signs are noted.
Spironolactone Counseling: Patient advised regarding risks of diarrhea, abdominal pain, hyperkalemia, birth defects (for female patients), liver toxicity and renal toxicity. The patient may need blood work to monitor liver and kidney function and potassium levels while on therapy. The patient verbalized understanding of the proper use and possible adverse effects of spironolactone.  All of the patient's questions and concerns were addressed.
Wartpeel Counseling:  I discussed with the patient the risks of Wartpeel including but not limited to erythema, scaling, itching, weeping, crusting, and pain.
Valtrex Counseling: I discussed with the patient the risks of valacyclovir including but not limited to kidney damage, nausea, vomiting and severe allergy.  The patient understands that if the infection seems to be worsening or is not improving, they are to call.
Drysol Counseling:  I discussed with the patient the risks of drysol/aluminum chloride including but not limited to skin rash, itching, irritation, burning.
Doxycycline Counseling:  Patient counseled regarding possible photosensitivity and increased risk for sunburn.  Patient instructed to avoid sunlight, if possible.  When exposed to sunlight, patients should wear protective clothing, sunglasses, and sunscreen.  The patient was instructed to call the office immediately if the following severe adverse effects occur:  hearing changes, easy bruising/bleeding, severe headache, or vision changes.  The patient verbalized understanding of the proper use and possible adverse effects of doxycycline.  All of the patient's questions and concerns were addressed.
Dupixent Counseling: I discussed with the patient the risks of dupilumab including but not limited to eye infection and irritation, cold sores, injection site reactions, worsening of asthma, allergic reactions and increased risk of parasitic infection.  Live vaccines should be avoided while taking dupilumab. Dupilumab will also interact with certain medications such as warfarin and cyclosporine. The patient understands that monitoring is required and they must alert us or the primary physician if symptoms of infection or other concerning signs are noted.
Rinvoq Counseling: I discussed with the patient the risks of Rinvoq therapy including but not limited to upper respiratory tract infections, shingles, cold sores, bronchitis, nausea, cough, fever, acne, and headache. Live vaccines should be avoided.  This medication has been linked to serious infections; higher rate of mortality; malignancy and lymphoproliferative disorders; major adverse cardiovascular events; thrombosis; thrombocytopenia, anemia, and neutropenia; lipid elevations; liver enzyme elevations; and gastrointestinal perforations.
Dapsone Counseling: I discussed with the patient the risks of dapsone including but not limited to hemolytic anemia, agranulocytosis, rashes, methemoglobinemia, kidney failure, peripheral neuropathy, headaches, GI upset, and liver toxicity.  Patients who start dapsone require monitoring including baseline LFTs and weekly CBCs for the first month, then every month thereafter.  The patient verbalized understanding of the proper use and possible adverse effects of dapsone.  All of the patient's questions and concerns were addressed.
Bexarotene Pregnancy And Lactation Text: This medication is Pregnancy Category X and should not be given to women who are pregnant or may become pregnant. This medication should not be used if you are breast feeding.
Nsaids Counseling: NSAID Counseling: I discussed with the patient that NSAIDs should be taken with food. Prolonged use of NSAIDs can result in the development of stomach ulcers.  Patient advised to stop taking NSAIDs if abdominal pain occurs.  The patient verbalized understanding of the proper use and possible adverse effects of NSAIDs.  All of the patient's questions and concerns were addressed.
Odomzo Counseling- I discussed with the patient the risks of Odomzo including but not limited to nausea, vomiting, diarrhea, constipation, weight loss, changes in the sense of taste, decreased appetite, muscle spasms, and hair loss.  The patient verbalized understanding of the proper use and possible adverse effects of Odomzo.  All of the patient's questions and concerns were addressed.
Solaraze Counseling:  I discussed with the patient the risks of Solaraze including but not limited to erythema, scaling, itching, weeping, crusting, and pain.

## 2023-05-04 NOTE — PROCEDURE: TREATMENT REGIMEN
Initiate Treatment: clobetasol 0.05 % topical cream BID\\nQuantity: 60.0 g  Days Supply: 30\\nSig: Apply twice daily to rash up to 2 weeks/month as needed.
Detail Level: Zone

## 2024-05-02 ENCOUNTER — APPOINTMENT (RX ONLY)
Dept: RURAL CLINIC 1 | Facility: CLINIC | Age: 69
Setting detail: DERMATOLOGY
End: 2024-05-02

## 2024-05-02 DIAGNOSIS — D18.0 HEMANGIOMA: ICD-10-CM

## 2024-05-02 DIAGNOSIS — Z71.89 OTHER SPECIFIED COUNSELING: ICD-10-CM

## 2024-05-02 DIAGNOSIS — L82.1 OTHER SEBORRHEIC KERATOSIS: ICD-10-CM

## 2024-05-02 DIAGNOSIS — Z12.83 ENCOUNTER FOR SCREENING FOR MALIGNANT NEOPLASM OF SKIN: ICD-10-CM

## 2024-05-02 DIAGNOSIS — D22 MELANOCYTIC NEVI: ICD-10-CM

## 2024-05-02 PROBLEM — D22.71 MELANOCYTIC NEVI OF RIGHT LOWER LIMB, INCLUDING HIP: Status: ACTIVE | Noted: 2024-05-02

## 2024-05-02 PROBLEM — D22.5 MELANOCYTIC NEVI OF TRUNK: Status: ACTIVE | Noted: 2024-05-02

## 2024-05-02 PROBLEM — D18.01 HEMANGIOMA OF SKIN AND SUBCUTANEOUS TISSUE: Status: ACTIVE | Noted: 2024-05-02

## 2024-05-02 PROBLEM — D22.72 MELANOCYTIC NEVI OF LEFT LOWER LIMB, INCLUDING HIP: Status: ACTIVE | Noted: 2024-05-02

## 2024-05-02 PROCEDURE — ? COUNSELING

## 2024-05-02 PROCEDURE — 99213 OFFICE O/P EST LOW 20 MIN: CPT

## 2024-05-02 PROCEDURE — ? SUNSCREEN RECOMMENDATIONS

## 2024-05-02 ASSESSMENT — LOCATION SIMPLE DESCRIPTION DERM
LOCATION SIMPLE: RIGHT KNEE
LOCATION SIMPLE: LEFT CALF
LOCATION SIMPLE: ABDOMEN
LOCATION SIMPLE: RIGHT PRETIBIAL REGION
LOCATION SIMPLE: RIGHT UPPER BACK
LOCATION SIMPLE: CHEST
LOCATION SIMPLE: LEFT THIGH
LOCATION SIMPLE: LEFT POSTERIOR THIGH
LOCATION SIMPLE: UPPER BACK
LOCATION SIMPLE: LEFT PRETIBIAL REGION
LOCATION SIMPLE: LEFT FOREHEAD

## 2024-05-02 ASSESSMENT — LOCATION DETAILED DESCRIPTION DERM
LOCATION DETAILED: LEFT DISTAL POSTERIOR THIGH
LOCATION DETAILED: PERIUMBILICAL SKIN
LOCATION DETAILED: LEFT PROXIMAL PRETIBIAL REGION
LOCATION DETAILED: LEFT ANTERIOR PROXIMAL THIGH
LOCATION DETAILED: EPIGASTRIC SKIN
LOCATION DETAILED: LEFT LATERAL FOREHEAD
LOCATION DETAILED: SUPERIOR THORACIC SPINE
LOCATION DETAILED: LEFT DISTAL CALF
LOCATION DETAILED: LEFT MEDIAL INFERIOR CHEST
LOCATION DETAILED: LEFT LATERAL INFERIOR CHEST
LOCATION DETAILED: RIGHT LATERAL UPPER BACK
LOCATION DETAILED: RIGHT PROXIMAL PRETIBIAL REGION
LOCATION DETAILED: RIGHT KNEE

## 2024-05-02 ASSESSMENT — LOCATION ZONE DERM
LOCATION ZONE: FACE
LOCATION ZONE: TRUNK
LOCATION ZONE: LEG